# Patient Record
Sex: FEMALE | Race: WHITE | NOT HISPANIC OR LATINO | Employment: OTHER | URBAN - METROPOLITAN AREA
[De-identification: names, ages, dates, MRNs, and addresses within clinical notes are randomized per-mention and may not be internally consistent; named-entity substitution may affect disease eponyms.]

---

## 2023-04-13 ENCOUNTER — PATIENT OUTREACH (OUTPATIENT)
Dept: PRIMARY CARE | Facility: CLINIC | Age: 73
End: 2023-04-13
Payer: MEDICARE

## 2023-04-13 DIAGNOSIS — K92.2 GASTROINTESTINAL HEMORRHAGE, UNSPECIFIED GASTROINTESTINAL HEMORRHAGE TYPE: ICD-10-CM

## 2023-04-13 NOTE — PROGRESS NOTES
Discharge Facility: [facility]  Discharge Diagnosis: GI Bleed  Admission Date: 04/09/23  Discharge Date: 04/12/23    PCP appt: 04/20/23    Engagement  Call Start Time: 1049 (4/13/2023 10:49 AM)    Medications  Medications reviewed with patient/caregiver?: Yes (new/changed only) (4/13/2023 10:49 AM)  Is the patient having any side effects they believe may be caused by any medication additions or changes?: No (4/13/2023 10:49 AM)  Does the patient have all medications ordered at discharge?: Yes (4/13/2023 10:49 AM)  Care Management Interventions: No intervention needed (4/13/2023 10:49 AM)  Is the patient taking all medications as directed (includes completed medication regime)?: Yes (4/13/2023 10:49 AM)    Appointments  Does the patient have a primary care provider?: Yes (4/13/2023 10:49 AM)  Care Management Interventions: Verified appointment date/time/provider (4/13/2023 10:49 AM)  Has the patient kept scheduled appointments due by today?: Yes (4/13/2023 10:49 AM)  Care Management Interventions: Advised patient to keep appointment (4/13/2023 10:49 AM)    Self Management  Has home health visited the patient within 72 hours of discharge?: Not applicable (4/13/2023 10:49 AM)    Patient Teaching  Does the patient have access to their discharge instructions?: Yes (4/13/2023 10:49 AM)  What is the patient's perception of their health status since discharge?: Improving (4/13/2023 10:49 AM)    Wrap Up  Call End Time: 1112 (4/13/2023 10:49 AM)

## 2023-04-18 PROBLEM — T78.40XA ALLERGIES: Status: ACTIVE | Noted: 2023-04-18

## 2023-04-18 PROBLEM — K92.1 HEMATOCHEZIA: Status: ACTIVE | Noted: 2023-04-18

## 2023-04-18 PROBLEM — R19.7 NAUSEA, VOMITING, AND DIARRHEA: Status: ACTIVE | Noted: 2023-04-18

## 2023-04-18 PROBLEM — E78.5 HYPERLIPIDEMIA: Status: ACTIVE | Noted: 2023-04-18

## 2023-04-18 PROBLEM — G47.00 INSOMNIA: Status: ACTIVE | Noted: 2023-04-18

## 2023-04-18 PROBLEM — E03.9 ADULT HYPOTHYROIDISM: Status: ACTIVE | Noted: 2023-04-18

## 2023-04-18 PROBLEM — E86.0 DEHYDRATION: Status: ACTIVE | Noted: 2023-04-18

## 2023-04-18 PROBLEM — R07.89 ATYPICAL CHEST PAIN: Status: ACTIVE | Noted: 2023-04-18

## 2023-04-18 PROBLEM — B34.9 VIREMIA: Status: ACTIVE | Noted: 2023-04-18

## 2023-04-18 PROBLEM — F41.1 GENERALIZED ANXIETY DISORDER: Status: ACTIVE | Noted: 2023-04-18

## 2023-04-18 PROBLEM — K52.9 COLITIS: Status: ACTIVE | Noted: 2023-04-18

## 2023-04-18 PROBLEM — E66.3 OVERWEIGHT: Status: ACTIVE | Noted: 2023-04-18

## 2023-04-18 PROBLEM — R11.2 NAUSEA, VOMITING, AND DIARRHEA: Status: ACTIVE | Noted: 2023-04-18

## 2023-04-18 PROBLEM — R73.9 HYPERGLYCEMIA: Status: ACTIVE | Noted: 2023-04-18

## 2023-04-18 RX ORDER — CHOLECALCIFEROL (VITAMIN D3) 25 MCG
1 TABLET ORAL DAILY
COMMUNITY
Start: 2017-04-26

## 2023-04-18 RX ORDER — ASPIRIN 81 MG/1
1 TABLET ORAL DAILY
COMMUNITY
Start: 2017-04-26 | End: 2023-04-20 | Stop reason: SINTOL

## 2023-04-18 RX ORDER — ONDANSETRON 4 MG/1
TABLET, ORALLY DISINTEGRATING ORAL 4 TIMES DAILY PRN
COMMUNITY
Start: 2023-04-06 | End: 2023-08-15 | Stop reason: ALTCHOICE

## 2023-04-18 RX ORDER — ATORVASTATIN CALCIUM 20 MG/1
1 TABLET, FILM COATED ORAL DAILY
COMMUNITY
Start: 2021-09-21 | End: 2023-08-15 | Stop reason: SDUPTHER

## 2023-04-18 RX ORDER — MAGNESIUM 200 MG
1 TABLET ORAL DAILY
COMMUNITY
Start: 2017-04-26 | End: 2023-08-15 | Stop reason: ALTCHOICE

## 2023-04-18 RX ORDER — BUSPIRONE HYDROCHLORIDE 10 MG/1
TABLET ORAL
COMMUNITY
Start: 2023-02-13 | End: 2023-08-15 | Stop reason: SINTOL

## 2023-04-18 RX ORDER — BENZONATATE 100 MG/1
CAPSULE ORAL
COMMUNITY
Start: 2023-04-06 | End: 2023-08-15 | Stop reason: ALTCHOICE

## 2023-04-18 RX ORDER — POTASSIUM CHLORIDE 750 MG/1
1 TABLET, FILM COATED, EXTENDED RELEASE ORAL DAILY
Qty: 29 TABLET | Refills: 0 | COMMUNITY
Start: 2023-04-12 | End: 2023-05-11

## 2023-04-18 RX ORDER — BUSPIRONE HYDROCHLORIDE 5 MG/1
1 TABLET ORAL 2 TIMES DAILY
COMMUNITY
Start: 2023-01-16 | End: 2023-08-15 | Stop reason: SINTOL

## 2023-04-18 RX ORDER — CALCIUM CARBONATE 600 MG
1 TABLET ORAL DAILY
COMMUNITY
Start: 2017-04-26

## 2023-04-20 ENCOUNTER — OFFICE VISIT (OUTPATIENT)
Dept: PRIMARY CARE | Facility: CLINIC | Age: 73
End: 2023-04-20
Payer: MEDICARE

## 2023-04-20 ENCOUNTER — PATIENT OUTREACH (OUTPATIENT)
Dept: PRIMARY CARE | Facility: CLINIC | Age: 73
End: 2023-04-20

## 2023-04-20 VITALS
WEIGHT: 162 LBS | SYSTOLIC BLOOD PRESSURE: 100 MMHG | DIASTOLIC BLOOD PRESSURE: 84 MMHG | OXYGEN SATURATION: 98 % | HEIGHT: 65 IN | HEART RATE: 87 BPM | BODY MASS INDEX: 26.99 KG/M2 | TEMPERATURE: 97.5 F

## 2023-04-20 DIAGNOSIS — K52.9 COLITIS: Primary | ICD-10-CM

## 2023-04-20 DIAGNOSIS — E78.41 ELEVATED LIPOPROTEIN(A): ICD-10-CM

## 2023-04-20 DIAGNOSIS — F51.01 PRIMARY INSOMNIA: ICD-10-CM

## 2023-04-20 DIAGNOSIS — E66.3 OVERWEIGHT: ICD-10-CM

## 2023-04-20 DIAGNOSIS — R73.9 HYPERGLYCEMIA: ICD-10-CM

## 2023-04-20 DIAGNOSIS — F41.1 GENERALIZED ANXIETY DISORDER: ICD-10-CM

## 2023-04-20 DIAGNOSIS — U07.1 COVID-19: ICD-10-CM

## 2023-04-20 DIAGNOSIS — E03.9 ADULT HYPOTHYROIDISM: ICD-10-CM

## 2023-04-20 PROCEDURE — 1036F TOBACCO NON-USER: CPT | Performed by: FAMILY MEDICINE

## 2023-04-20 PROCEDURE — 1160F RVW MEDS BY RX/DR IN RCRD: CPT | Performed by: FAMILY MEDICINE

## 2023-04-20 PROCEDURE — 99213 OFFICE O/P EST LOW 20 MIN: CPT | Performed by: FAMILY MEDICINE

## 2023-04-20 PROCEDURE — 1159F MED LIST DOCD IN RCRD: CPT | Performed by: FAMILY MEDICINE

## 2023-04-20 ASSESSMENT — ENCOUNTER SYMPTOMS
UNEXPECTED WEIGHT CHANGE: 1
ACTIVITY CHANGE: 1
APPETITE CHANGE: 1
BLOOD IN STOOL: 1
LIGHT-HEADEDNESS: 1
FREQUENCY: 1
ENDOCRINE NEGATIVE: 1
CARDIOVASCULAR NEGATIVE: 1
ARTHRALGIAS: 1
NERVOUS/ANXIOUS: 1
HEMATOLOGIC/LYMPHATIC NEGATIVE: 1
ALLERGIC/IMMUNOLOGIC NEGATIVE: 1

## 2023-04-20 NOTE — PROGRESS NOTES
"Subjective   Patient ID: Toney Sierra is a 72 y.o. female who presents for Follow-up (Hospital follow up).    Hospital follow up         Review of Systems   Constitutional:  Positive for activity change, appetite change and unexpected weight change.   HENT:  Positive for congestion.    Eyes:  Positive for visual disturbance.   Respiratory:  Shortness of breath: on exertion.    Cardiovascular: Negative.    Gastrointestinal:  Positive for blood in stool.   Endocrine: Negative.    Genitourinary:  Positive for frequency.   Musculoskeletal:  Positive for arthralgias.   Allergic/Immunologic: Negative.    Neurological:  Positive for light-headedness.   Hematological: Negative.    Psychiatric/Behavioral:  The patient is nervous/anxious.        Objective   /84 (BP Location: Left arm, Patient Position: Sitting, BP Cuff Size: Adult)   Pulse 87   Temp 36.4 °C (97.5 °F) (Temporal)   Ht 1.651 m (5' 5\")   Wt 73.5 kg (162 lb)   SpO2 98%   BMI 26.96 kg/m²     Physical Exam  Constitutional:       Appearance: She is obese.   HENT:      Head: Normocephalic and atraumatic.      Nose: Nose normal.      Mouth/Throat:      Mouth: Mucous membranes are moist.   Eyes:      Pupils: Pupils are equal, round, and reactive to light.   Cardiovascular:      Rate and Rhythm: Normal rate and regular rhythm.   Pulmonary:      Effort: Pulmonary effort is normal.      Breath sounds: Normal breath sounds.   Abdominal:      General: Abdomen is flat.      Palpations: Abdomen is soft.   Musculoskeletal:         General: Normal range of motion.      Cervical back: Normal range of motion.   Skin:     General: Skin is warm and dry.   Neurological:      General: No focal deficit present.      Mental Status: She is alert and oriented to person, place, and time.   Psychiatric:         Mood and Affect: Mood normal.         Behavior: Behavior normal.         Assessment/Plan          "

## 2023-05-04 DIAGNOSIS — K52.9 COLITIS: Primary | ICD-10-CM

## 2023-05-04 RX ORDER — DICYCLOMINE HYDROCHLORIDE 10 MG/1
10 CAPSULE ORAL 2 TIMES DAILY
Qty: 60 CAPSULE | Refills: 3 | Status: SHIPPED | OUTPATIENT
Start: 2023-05-04 | End: 2023-06-03

## 2023-05-12 ENCOUNTER — PATIENT OUTREACH (OUTPATIENT)
Dept: PRIMARY CARE | Facility: CLINIC | Age: 73
End: 2023-05-12
Payer: MEDICARE

## 2023-05-12 NOTE — PROGRESS NOTES
Call placed regarding one month post discharge follow up call.  At time of outreach call the patient feels as if their condition has improved since initial visit with PCP or specialist.  Questions or concerns regarding recovery period addressed at this time.   Reviewed any PCP or specialists progress notes/labs/radiology reports if applicable and addressed any questions or concerns.

## 2023-06-05 ENCOUNTER — HOSPITAL ENCOUNTER (OUTPATIENT)
Dept: DATA CONVERSION | Facility: HOSPITAL | Age: 73
End: 2023-06-05
Attending: SURGERY | Admitting: SURGERY
Payer: MEDICARE

## 2023-06-05 DIAGNOSIS — K57.30 DIVERTICULOSIS OF LARGE INTESTINE WITHOUT PERFORATION OR ABSCESS WITHOUT BLEEDING: ICD-10-CM

## 2023-06-05 DIAGNOSIS — R10.32 LEFT LOWER QUADRANT PAIN: ICD-10-CM

## 2023-06-05 DIAGNOSIS — K52.9 NONINFECTIVE GASTROENTERITIS AND COLITIS, UNSPECIFIED: ICD-10-CM

## 2023-06-05 DIAGNOSIS — Z86.16 PERSONAL HISTORY OF COVID-19: ICD-10-CM

## 2023-06-09 LAB
COMPLETE PATHOLOGY REPORT: NORMAL
CONVERTED CLINICAL DIAGNOSIS-HISTORY: NORMAL
CONVERTED FINAL DIAGNOSIS: NORMAL
CONVERTED FINAL REPORT PDF LINK TO COPY AND PASTE: NORMAL
CONVERTED GROSS DESCRIPTION: NORMAL

## 2023-07-12 ENCOUNTER — DOCUMENTATION (OUTPATIENT)
Dept: PRIMARY CARE | Facility: CLINIC | Age: 73
End: 2023-07-12
Payer: MEDICARE

## 2023-08-15 ENCOUNTER — OFFICE VISIT (OUTPATIENT)
Dept: PRIMARY CARE | Facility: CLINIC | Age: 73
End: 2023-08-15
Payer: MEDICARE

## 2023-08-15 VITALS
HEIGHT: 65 IN | OXYGEN SATURATION: 96 % | SYSTOLIC BLOOD PRESSURE: 147 MMHG | WEIGHT: 167 LBS | HEART RATE: 85 BPM | DIASTOLIC BLOOD PRESSURE: 97 MMHG | BODY MASS INDEX: 27.82 KG/M2

## 2023-08-15 DIAGNOSIS — F41.1 GENERALIZED ANXIETY DISORDER: ICD-10-CM

## 2023-08-15 DIAGNOSIS — E55.9 VITAMIN D DEFICIENCY: ICD-10-CM

## 2023-08-15 DIAGNOSIS — Z79.899 DRUG THERAPY: ICD-10-CM

## 2023-08-15 DIAGNOSIS — K52.9 COLITIS: ICD-10-CM

## 2023-08-15 DIAGNOSIS — E66.3 OVERWEIGHT: ICD-10-CM

## 2023-08-15 DIAGNOSIS — E78.5 HYPERLIPIDEMIA, UNSPECIFIED HYPERLIPIDEMIA TYPE: Primary | ICD-10-CM

## 2023-08-15 DIAGNOSIS — E78.41 ELEVATED LIPOPROTEIN(A): ICD-10-CM

## 2023-08-15 DIAGNOSIS — Z00.00 PREVENTATIVE HEALTH CARE: Primary | ICD-10-CM

## 2023-08-15 DIAGNOSIS — Z13.9 SCREENING FOR CONDITION: ICD-10-CM

## 2023-08-15 DIAGNOSIS — R73.03 PREDIABETES: ICD-10-CM

## 2023-08-15 DIAGNOSIS — R51.9 NONINTRACTABLE HEADACHE, UNSPECIFIED CHRONICITY PATTERN, UNSPECIFIED HEADACHE TYPE: ICD-10-CM

## 2023-08-15 PROCEDURE — 99215 OFFICE O/P EST HI 40 MIN: CPT | Performed by: FAMILY MEDICINE

## 2023-08-15 PROCEDURE — 1159F MED LIST DOCD IN RCRD: CPT | Performed by: FAMILY MEDICINE

## 2023-08-15 PROCEDURE — 99397 PER PM REEVAL EST PAT 65+ YR: CPT | Performed by: FAMILY MEDICINE

## 2023-08-15 PROCEDURE — 1036F TOBACCO NON-USER: CPT | Performed by: FAMILY MEDICINE

## 2023-08-15 PROCEDURE — 1160F RVW MEDS BY RX/DR IN RCRD: CPT | Performed by: FAMILY MEDICINE

## 2023-08-15 RX ORDER — DICYCLOMINE HYDROCHLORIDE 10 MG/1
CAPSULE ORAL
Refills: 0
Start: 2023-08-15

## 2023-08-15 NOTE — PATIENT INSTRUCTIONS
Next Medicare wellness visit will be in 2024.  We will do annual preventative visit today  ------  Up-to-date on annual flu shot, next 1 due in the next month or so.  Pneumonia has had the PPSV23, is due for the PCV 20.  Due for shingles series.  Due for Tdap.  Has had coronavirus series, is due for the by valent booster.  -->> Check with your pharmacy to get up-to-date on immunizations.    Most recent colonoscopy was early 2023.  Due for repeat between 2028 and 2033.  Due for mammogram.  Patient declines offer for orders today.  ------    Headaches associated with visual disturbance.  Migraine is in the differential.  Maybe gets 1 every month or 2.  Lasting for only maybe 5 minutes.  Does well enough taking Excedrin Migraine as needed. -->>  Could consider restarting magnesium to see if that helps prevent the headaches.  Will reevaluate next time.      Overweight, BMI 27.  Patient has lost a be 10 pounds over the last year, down from max of 180. -->>  Keep up the good work.    Reviewed some labs on chart:  - Drug therapy, screening for condition, vitamin D deficiency.  Patient is taking vitamin D, 1000 units daily and has been for several years. -->>  Will be checking annual labs before next appointment.  -Prediabetes, A1c last time was 5.9, before that was 5.8.  Will check with next labs. -->> Recommend cutting back on simple carbohydrates, things like bread, pasta, potatoes, rice, sugars etc.   -Hyperlipidemia, most recent HDL was 69, goal is over 45, LDL was 72, goal is 70 or less, total to good ratio is 2.4, goal is less than 2.8, so cholesterol numbers were great on atorvastatin 20 mg daily. -->>  We will be checking fasting lipid panel with next labs.  Recommending also check on starting CoQ 10, 100 or 200 mg daily.  Should be able to find it for under $0.20 a pill.  Cheapest is fine, the more expensive ones are not that much better to make it worth the extra expense.      Anxiety.  In general doing well  enough without meds.  Patient is considering starting a meditative practice of some sort.  Looking for possibly a group to work with.  We also mentioned that could look just for videos on YouTube regarding the guided visualization, on a beach, or on [place], and spent 5 minutes daily just letting go of what ever thoughts are subconscious keeps throwing at you.      - We will schedule lab review for October 17 at 11:40 AM, overlapping her 's appointment.  - We ordered annual labs for patient to get fasting annual labs at the [facility] at least a week before next appointment.

## 2023-08-15 NOTE — PROGRESS NOTES
Subjective   Patient ID: Toney Sierra is a 72 y.o. female who presents for Establish Care (Pt in today to establish care).    Review of Systems  Denies N/V/D/C, no S/V, denies rashes/lesions, no CP/SOB. Denies fevers/chills. Positive for HAs.  All other systems were negative.    Objective   Physical Exam    Assessment/Plan   Diagnoses and all orders for this visit:  Colitis  -     dicyclomine (Bentyl) 10 mg capsule; 1 by mouth twice daily, prescribed by Dr. Allen  [facility] care  Prediabetes  Generalized anxiety disorder  Overweight  Elevated lipoprotein(a)  Drug therapy  Screening for condition  Nonintractable headache, unspecified chronicity pattern, unspecified headache type         Next Medicare wellness visit will be in 2024.  We will do annual preventative visit today  ------  Up-to-date on annual flu shot, next 1 due in the next month or so.  Pneumonia has had the PPSV23, is due for the PCV 20.  Due for shingles series.  Due for Tdap.  Has had coronavirus series, is due for the by valent booster.  -->> Check with your pharmacy to get up-to-date on immunizations.    Most recent colonoscopy was early 2023.  Due for repeat between 2028 and 2033.  Due for mammogram.  Patient declines offer for orders today.  ------  Headaches associated with visual disturbance.  Migraine is in the differential.  Maybe gets 1 every month or 2.  Lasting for only maybe 5 minutes.  Does well enough taking Excedrin Migraine as needed. -->>  Could consider restarting magnesium to see if that helps prevent the headaches.  Will reevaluate next time.    Overweight, BMI 27.  Patient has lost a be 10 pounds over the last year, down from max of 180. -->>  Keep up the good work.    Reviewed some labs on chart:  - Drug therapy, screening for condition, vitamin D deficiency.  Patient is taking vitamin D, 1000 units daily and has been for several years. -->>  Will be checking annual labs before next appointment.  -Prediabetes, A1c  last time was 5.9, before that was 5.8.  Will check with next labs. -->> Recommend cutting back on simple carbohydrates, things like bread, pasta, potatoes, rice, sugars etc.   -Hyperlipidemia, most recent HDL was 69, goal is over 45, LDL was 72, goal is 70 or less, total to good ratio is 2.4, goal is less than 2.8, so cholesterol numbers were great on atorvastatin 20 mg daily. -->>  We will be checking fasting lipid panel with next labs.  Recommending also check on starting CoQ 10, 100 or 200 mg daily.  Should be able to find it for under $0.20 a pill.  Cheapest is fine, the more expensive ones are not that much better to make it worth the extra expense.      Anxiety.  In general doing well enough without meds.  Patient is considering starting a meditative practice of some sort.  Looking for possibly a group to work with.  We also mentioned that could look just for videos on YouTube regarding the guided visualization, on a beach, or on [place], and spent 5 minutes daily just letting go of what ever thoughts are subconscious keeps throwing at you.      - We will schedule lab review for October 17 at 11:40 AM, overlapping her 's appointment.  - We ordered annual labs for patient to get fasting annual labs at the [facility] at least a week before next appointment.

## 2023-08-17 RX ORDER — ATORVASTATIN CALCIUM 20 MG/1
20 TABLET, FILM COATED ORAL DAILY
Qty: 90 TABLET | Refills: 1 | Status: SHIPPED | OUTPATIENT
Start: 2023-08-17 | End: 2024-02-19

## 2023-09-07 VITALS
HEART RATE: 79 BPM | RESPIRATION RATE: 16 BRPM | SYSTOLIC BLOOD PRESSURE: 116 MMHG | DIASTOLIC BLOOD PRESSURE: 77 MMHG | TEMPERATURE: 96.4 F

## 2023-09-27 ENCOUNTER — HOSPITAL ENCOUNTER (OUTPATIENT)
Dept: DATA CONVERSION | Facility: HOSPITAL | Age: 73
Setting detail: OBSERVATION
Discharge: HOME | End: 2023-09-28
Attending: STUDENT IN AN ORGANIZED HEALTH CARE EDUCATION/TRAINING PROGRAM | Admitting: STUDENT IN AN ORGANIZED HEALTH CARE EDUCATION/TRAINING PROGRAM
Payer: MEDICARE

## 2023-09-27 LAB
ALANINE AMINOTRANSFERASE (SGPT) (U/L) IN SER/PLAS: 12 U/L (ref 7–45)
ALBUMIN (G/DL) IN SER/PLAS: 4.2 G/DL (ref 3.4–5)
ALKALINE PHOSPHATASE (U/L) IN SER/PLAS: 48 U/L (ref 33–136)
ANION GAP IN SER/PLAS: 13 MMOL/L (ref 10–20)
ANION GAP IN SER/PLAS: NORMAL
APPEARANCE, URINE: CLEAR
ASPARTATE AMINOTRANSFERASE (SGOT) (U/L) IN SER/PLAS: 18 U/L (ref 9–39)
BASOPHILS (10*3/UL) IN BLOOD BY AUTOMATED COUNT: 0.03 X10E9/L (ref 0–0.1)
BASOPHILS/100 LEUKOCYTES IN BLOOD BY AUTOMATED COUNT: 0.5 % (ref 0–2)
BILIRUBIN TOTAL (MG/DL) IN SER/PLAS: 0.7 MG/DL (ref 0–1.2)
BILIRUBIN, URINE: NEGATIVE
BLOOD, URINE: NEGATIVE
CALCIUM (MG/DL) IN SER/PLAS: 9.1 MG/DL (ref 8.6–10.3)
CALCIUM (MG/DL) IN SER/PLAS: NORMAL
CARBON DIOXIDE, TOTAL (MMOL/L) IN SER/PLAS: 26 MMOL/L (ref 21–32)
CARBON DIOXIDE, TOTAL (MMOL/L) IN SER/PLAS: NORMAL
CHLORIDE (MMOL/L) IN SER/PLAS: 107 MMOL/L (ref 98–107)
CHLORIDE (MMOL/L) IN SER/PLAS: NORMAL
CHOLESTEROL (MG/DL) IN SER/PLAS: NORMAL
CHOLESTEROL IN HDL (MG/DL) IN SER/PLAS: NORMAL
CHOLESTEROL/HDL RATIO: NORMAL
COBALAMIN (VITAMIN B12) (PG/ML) IN SER/PLAS: NORMAL
COLOR, URINE: NORMAL
CREATININE (MG/DL) IN SER/PLAS: 0.87 MG/DL (ref 0.5–1.05)
CREATININE (MG/DL) IN SER/PLAS: NORMAL
EOSINOPHILS (10*3/UL) IN BLOOD BY AUTOMATED COUNT: 0.07 X10E9/L (ref 0–0.4)
EOSINOPHILS/100 LEUKOCYTES IN BLOOD BY AUTOMATED COUNT: 1.1 % (ref 0–6)
ERYTHROCYTE DISTRIBUTION WIDTH (RATIO) BY AUTOMATED COUNT: 12.7 % (ref 11.5–14.5)
ERYTHROCYTE DISTRIBUTION WIDTH (RATIO) BY AUTOMATED COUNT: NORMAL
ERYTHROCYTE MEAN CORPUSCULAR HEMOGLOBIN CONCENTRATION (G/DL) BY AUTOMATED: 33.2 G/DL (ref 32–36)
ERYTHROCYTE MEAN CORPUSCULAR HEMOGLOBIN CONCENTRATION (G/DL) BY AUTOMATED: NORMAL
ERYTHROCYTE MEAN CORPUSCULAR VOLUME (FL) BY AUTOMATED COUNT: 95 FL (ref 80–100)
ERYTHROCYTE MEAN CORPUSCULAR VOLUME (FL) BY AUTOMATED COUNT: NORMAL
ERYTHROCYTES (10*6/UL) IN BLOOD BY AUTOMATED COUNT: 4.34 X10E12/L (ref 4–5.2)
ERYTHROCYTES (10*6/UL) IN BLOOD BY AUTOMATED COUNT: NORMAL
ESTIMATED AVERAGE GLUCOSE FOR HBA1C: NORMAL
ETHANOL (MG/DL) IN SER/PLAS: <10 MG/DL
GFR FEMALE: 71 ML/MIN/1.73M2
GFR FEMALE: NORMAL
GFR MALE: NORMAL
GLUCOSE (MG/DL) IN SER/PLAS: 118 MG/DL (ref 74–99)
GLUCOSE (MG/DL) IN SER/PLAS: NORMAL
GLUCOSE, URINE: NEGATIVE MG/DL
HEMATOCRIT (%) IN BLOOD BY AUTOMATED COUNT: 41.3 % (ref 36–46)
HEMATOCRIT (%) IN BLOOD BY AUTOMATED COUNT: NORMAL
HEMOGLOBIN (G/DL) IN BLOOD: 13.7 G/DL (ref 12–16)
HEMOGLOBIN (G/DL) IN BLOOD: NORMAL
HEMOGLOBIN A1C/HEMOGLOBIN TOTAL IN BLOOD: NORMAL
HGB A1C-DATA CONVERSION: NORMAL %
IMMATURE GRANULOCYTES/100 LEUKOCYTES IN BLOOD BY AUTOMATED COUNT: 0.2 % (ref 0–0.9)
INR IN PPP BY COAGULATION ASSAY: 0.9 (ref 0.9–1.1)
KETONES, URINE: NEGATIVE MG/DL
LDL: NORMAL
LEUKOCYTE ESTERASE, URINE: NEGATIVE
LEUKOCYTES (10*3/UL) IN BLOOD BY AUTOMATED COUNT: 6.4 X10E9/L (ref 4.4–11.3)
LEUKOCYTES (10*3/UL) IN BLOOD BY AUTOMATED COUNT: NORMAL
LYMPHOCYTES (10*3/UL) IN BLOOD BY AUTOMATED COUNT: 1.57 X10E9/L (ref 0.8–3)
LYMPHOCYTES/100 LEUKOCYTES IN BLOOD BY AUTOMATED COUNT: 24.7 % (ref 13–44)
MAGNESIUM (MG/DL) IN SER/PLAS: NORMAL
MONOCYTES (10*3/UL) IN BLOOD BY AUTOMATED COUNT: 0.47 X10E9/L (ref 0.05–0.8)
MONOCYTES/100 LEUKOCYTES IN BLOOD BY AUTOMATED COUNT: 7.4 % (ref 2–10)
NEUTROPHILS (10*3/UL) IN BLOOD BY AUTOMATED COUNT: 4.2 X10E9/L (ref 1.6–5.5)
NEUTROPHILS/100 LEUKOCYTES IN BLOOD BY AUTOMATED COUNT: 66.1 % (ref 40–80)
NITRITE, URINE: NEGATIVE
NON HDL CHOLESTEROL: NORMAL
NRBC (PER 100 WBCS) BY AUTOMATED COUNT: NORMAL
PH, URINE: 7 (ref 5–8)
PLATELETS (10*3/UL) IN BLOOD AUTOMATED COUNT: 149 X10E9/L (ref 150–450)
PLATELETS (10*3/UL) IN BLOOD AUTOMATED COUNT: NORMAL
POCT GLUCOSE: 122 MG/DL (ref 74–99)
POTASSIUM (MMOL/L) IN SER/PLAS: 4 MMOL/L (ref 3.5–5.3)
POTASSIUM (MMOL/L) IN SER/PLAS: NORMAL
PROTEIN TOTAL: 6.4 G/DL (ref 6.4–8.2)
PROTEIN, URINE: NEGATIVE MG/DL
PROTHROMBIN TIME (PT) IN PPP BY COAGULATION ASSAY: 10.3 SEC (ref 9.8–12.8)
SODIUM (MMOL/L) IN SER/PLAS: 142 MMOL/L (ref 136–145)
SODIUM (MMOL/L) IN SER/PLAS: NORMAL
SPECIFIC GRAVITY, URINE: 1.01 (ref 1–1.03)
THYROTROPIN (MIU/L) IN SER/PLAS BY DETECTION LIMIT <= 0.05 MIU/L: NORMAL
TRIGLYCERIDE (MG/DL) IN SER/PLAS: NORMAL
TROPONIN I, HIGH SENSITIVITY: <3 NG/L (ref 0–13)
UREA NITROGEN (MG/DL) IN SER/PLAS: 13 MG/DL (ref 6–23)
UREA NITROGEN (MG/DL) IN SER/PLAS: NORMAL
UROBILINOGEN, URINE: <2 MG/DL (ref 0–1.9)
VLDL: NORMAL

## 2023-09-28 LAB
ANION GAP IN SER/PLAS: 13 MMOL/L (ref 10–20)
CALCIUM (MG/DL) IN SER/PLAS: 8.8 MG/DL (ref 8.6–10.3)
CARBON DIOXIDE, TOTAL (MMOL/L) IN SER/PLAS: 26 MMOL/L (ref 21–32)
CHLORIDE (MMOL/L) IN SER/PLAS: 106 MMOL/L (ref 98–107)
CHOLESTEROL (MG/DL) IN SER/PLAS: 168 MG/DL (ref 0–199)
CHOLESTEROL IN HDL (MG/DL) IN SER/PLAS: 67.1 MG/DL
CHOLESTEROL/HDL RATIO: 2.5
COBALAMIN (VITAMIN B12) (PG/ML) IN SER/PLAS: 1012 PG/ML (ref 211–911)
CREATININE (MG/DL) IN SER/PLAS: 0.75 MG/DL (ref 0.5–1.05)
ERYTHROCYTE DISTRIBUTION WIDTH (RATIO) BY AUTOMATED COUNT: 12.7 % (ref 11.5–14.5)
ERYTHROCYTE MEAN CORPUSCULAR HEMOGLOBIN CONCENTRATION (G/DL) BY AUTOMATED: 32.7 G/DL (ref 32–36)
ERYTHROCYTE MEAN CORPUSCULAR VOLUME (FL) BY AUTOMATED COUNT: 94 FL (ref 80–100)
ERYTHROCYTES (10*6/UL) IN BLOOD BY AUTOMATED COUNT: 4.42 X10E12/L (ref 4–5.2)
ESTIMATED AVERAGE GLUCOSE FOR HBA1C: 111 MG/DL
GFR FEMALE: 85 ML/MIN/1.73M2
GLUCOSE (MG/DL) IN SER/PLAS: 96 MG/DL (ref 74–99)
HEMATOCRIT (%) IN BLOOD BY AUTOMATED COUNT: 41.6 % (ref 36–46)
HEMOGLOBIN (G/DL) IN BLOOD: 13.6 G/DL (ref 12–16)
HEMOGLOBIN A1C/HEMOGLOBIN TOTAL IN BLOOD: 5.5 %
LDL: 75 MG/DL (ref 0–99)
LEUKOCYTES (10*3/UL) IN BLOOD BY AUTOMATED COUNT: 6.1 X10E9/L (ref 4.4–11.3)
MAGNESIUM (MG/DL) IN SER/PLAS: 2.08 MG/DL (ref 1.6–2.4)
PLATELETS (10*3/UL) IN BLOOD AUTOMATED COUNT: 156 X10E9/L (ref 150–450)
POTASSIUM (MMOL/L) IN SER/PLAS: 3.4 MMOL/L (ref 3.5–5.3)
SODIUM (MMOL/L) IN SER/PLAS: 142 MMOL/L (ref 136–145)
THYROTROPIN (MIU/L) IN SER/PLAS BY DETECTION LIMIT <= 0.05 MIU/L: 4.46 MIU/L (ref 0.44–3.98)
THYROXINE (T4) FREE (NG/DL) IN SER/PLAS: 0.77 NG/DL (ref 0.61–1.12)
THYROXINE (T4) FREE (NG/DL) IN SER/PLAS: NORMAL
TRIGLYCERIDE (MG/DL) IN SER/PLAS: 128 MG/DL (ref 0–149)
UREA NITROGEN (MG/DL) IN SER/PLAS: 13 MG/DL (ref 6–23)
VLDL: 26 MG/DL (ref 0–40)

## 2023-09-29 ENCOUNTER — PATIENT OUTREACH (OUTPATIENT)
Dept: PRIMARY CARE | Facility: CLINIC | Age: 73
End: 2023-09-29
Payer: MEDICARE

## 2023-09-29 VITALS
SYSTOLIC BLOOD PRESSURE: 188 MMHG | WEIGHT: 175.04 LBS | TEMPERATURE: 98.1 F | DIASTOLIC BLOOD PRESSURE: 93 MMHG | HEART RATE: 71 BPM | RESPIRATION RATE: 18 BRPM | BODY MASS INDEX: 29.16 KG/M2 | OXYGEN SATURATION: 96 % | HEIGHT: 65 IN

## 2023-09-29 RX ORDER — FLUOXETINE 10 MG/1
10 CAPSULE ORAL DAILY
COMMUNITY
Start: 2023-09-28

## 2023-09-29 RX ORDER — AMLODIPINE BESYLATE 5 MG/1
5 TABLET ORAL DAILY
COMMUNITY
Start: 2023-09-28 | End: 2024-02-22 | Stop reason: ALTCHOICE

## 2023-09-29 NOTE — PROGRESS NOTES
Discharge Facility:  [facility]  Discharge Diagnosis: TIA/Anxiety  Admission Date: 9/27/2023  Discharge Date: 9/28/2023    PCP Appointment Date: 10/5/2023  Specialist Appointment Date: TBD  Hospital Encounter and Summary: not available at this time  See discharge assessment below for further details    Engagement  Call Start Time: 1348 (9/29/2023  2:24 PM)    Medications  Medications reviewed with patient/caregiver?: Yes (meds discussed) (9/29/2023  2:24 PM)  Is the patient having any side effects they believe may be caused by any medication additions or changes?: No (9/29/2023  2:24 PM)  Does the patient have all medications ordered at discharge?: Yes (9/29/2023  2:24 PM)  Care Management Interventions: No intervention needed (9/29/2023  2:24 PM)  Prescription Comments: see med list (fluoxetine; amlodipine) (9/29/2023  2:24 PM)  Is the patient taking all medications as directed (includes completed medication regime)?: Yes (9/29/2023  2:24 PM)  Care Management Interventions: Provided patient education (9/29/2023  2:24 PM)    Appointments  Does the patient have a primary care provider?: Yes (9/29/2023  2:24 PM)  Care Management Interventions: Verified appointment date/time/provider (9/29/2023  2:24 PM)  Has the patient kept scheduled appointments due by today?: Yes (9/29/2023  2:24 PM)  Care Management Interventions: Advised patient to keep appointment (9/29/2023  2:24 PM)    Self Management  What is the home health agency?: denies need (9/29/2023  2:24 PM)  Has home health visited the patient within 72 hours of discharge?: Yes (9/29/2023  2:24 PM)    Patient Teaching  Does the patient have access to their discharge instructions?: Yes (9/29/2023  2:24 PM)  Care Management Interventions: Reviewed instructions with patient (9/29/2023  2:24 PM)  What is the patient's perception of their health status since discharge?: Improving (9/29/2023  2:24 PM)  Is the patient/caregiver able to teach back the hierarchy  "of who to call/visit for symptoms/problems? PCP, Specialist, Home Health nurse, Urgent Care, ED, 911: Yes (9/29/2023  2:24 PM)  Patient/Caregiver Education Comments: Patient states she is feeling better and states she thought she was having a stroke as her face was going numb but has contributed symptoms to anxiety now. States she was placed on new medication and she is \"anxious to get it to start working\" and realizes effects can take a couple weeks. (9/29/2023  2:24 PM)        "

## 2023-09-30 ENCOUNTER — LAB (OUTPATIENT)
Dept: LAB | Facility: LAB | Age: 73
End: 2023-09-30
Payer: MEDICARE

## 2023-09-30 DIAGNOSIS — Z00.00 PREVENTATIVE HEALTH CARE: ICD-10-CM

## 2023-09-30 DIAGNOSIS — E55.9 VITAMIN D DEFICIENCY: ICD-10-CM

## 2023-09-30 LAB — 25(OH)D3 SERPL-MCNC: 31 NG/ML (ref 30–100)

## 2023-09-30 PROCEDURE — 36415 COLL VENOUS BLD VENIPUNCTURE: CPT

## 2023-09-30 NOTE — H&P
History of Present Illness:   History Present Illness:  Reason for surgery: colitis   HPI:    as above    Allergies:        Allergies:  ·  No Known Allergies :     Home Medication Review:   Home Medications Reviewed: yes     Impression/Procedure:   ·  Impression and Planned Procedure: colonoscopy       ERAS (Enhanced Recovery After Surgery):  ·  ERAS Patient: no       Vital Signs:  Temperature C: 35.8 degrees C   Temperature F: 96.4 degrees F   Heart Rate: 79 beats per minute   Respiratory Rate: 16 breath per minute   Blood Pressure Systolic: 116 mm/Hg   Blood Pressure Diastolic: 77 mm/Hg     Physical Exam by System:    Constitutional: Well developed, awake/alert/oriented  x3, no distress, alert and cooperative   Eyes: PERRL, EOMI, clear sclera   ENMT: mucous membranes moist, no apparent injury,  no lesions seen   Head/Neck: Neck supple, no apparent injury   Respiratory/Thorax: Patent airways, CTAB, normal  breath sounds with good chest expansion, thorax symmetric   Cardiovascular: Regular, rate and rhythm   Gastrointestinal: Nondistended, soft, non-tender,  no rebound tenderness or guarding, no masses palpable, no organomegaly   Musculoskeletal: ROM intact, no joint swelling, normal  strength   Extremities: normal extremities, no cyanosis edema,  contusions or wounds, no clubbing   Neurological: alert and oriented x3, intact senses,  motor, response and reflexes, normal strength   Psychological: Appropriate mood and behavior   Skin: Warm and dry, no lesions, no rashes     Consent:   COVID-19 Consent:  ·  COVID-19 Risk Consent Surgeon has reviewed key risks related to the risk of bridger COVID-19 and if they contract COVID-19 what the risks are.       Electronic Signatures:  Pamela Allen)  (Signed 05-Jun-2023 08:20)   Authored: History of Present Illness, Allergies, Home  Medication Review, Impression/Procedure, ERAS, Physical Exam, Consent, Note Completion      Last Updated: 05-Jun-2023 08:20 by Alejandro  Pamela KNOWLES)

## 2023-09-30 NOTE — DISCHARGE SUMMARY
Send Summary:   Discharge Summary Providers:  Provider Role Provider Name   · Attending Angel Luis Deshpande   · Consulting Rodney Dalton   · Primary Rao Babin       Discharge:    Summary:   Admission Date: .27-Sep-2023 12:48:00   Discharge Date: 28-Sep-2023   Admission Reason: TIA-like symptoms   Final Discharge Diagnoses: TIA/anxiety   Procedures: none   Vital Signs:        T   P  R  BP   MAP  SpO2   Value  36.2  69  16  159/75   93  93%  Date/Time 9/28 9:13 9/28 9:13 9/28 9:13 9/28 9:13  9/28 0:20 9/28 9:13  Range  (36C - 36.7C )  (66 - 82 )  (14 - 18 )  (124 - 188 )/ (68 - 99 )  (93 - 121 )  (92% - 98% )    Date:            Weight/Scale Type:  Height:   27-Sep-2023 21:24  79.4  kg / bed  165.1  cm  Physical Exam:    Eyes: PERRL, EOMI, clear sclera   ENMT: mucous membranes moist, no apparent  injury, no lesions seen   Respiratory/Thorax: Patent airways, CTAB,  normal breath sounds with good chest expansion, thorax symmetric   Cardiovascular: Regular, rate and rhythm,  no murmurs, 2+ equal pulses of the extremities, normal S 1and S 2   Gastrointestinal: Nondistended, soft, non-tender,  no rebound tenderness or guarding, no masses palpable, no organomegaly, +BS, no bruits   Genitourinary: No Discharge, vesicles or  other abnormalities   Musculoskeletal: ROM intact, no joint swelling,  normal strength   Neurological: alert and oriented x3, intact  senses, motor, response and reflexes, normal strength     Hospital Course:    72-year-old female with past medical history of untreated anxiety, hyperlipidemia presented to the ER with strokelike symptoms.  Her symptoms were unusual for TIA  CVA.  Work-up including MRI, MRA head and neck, telemetry has been unremarkable.  Vitamin B12 level was actually high, TSH was borderline, free T4 has been ordered but I do not believe that is the cause of her symptoms.  We are waiting on an echocardiogram  once it is back and it is normal she can be discharged.  Neurology  has been consulted we will follow-up on recommendations.  We will advise her to take baby aspirin along with statin going forward we will also start her on a blood pressure medication.   Was started on Prozac for anxiety.      Discharge Information:    and Continuing Care:   Lab Results - Pending:    Hemoglobin A1C Drawn at 28-Sep-2023 05:31:00  Free Thyroxine, Serum Drawn at 28-Sep-2023 05:31:00  Radiology Results - Pending: None   Discharge Instructions:    Activity:           activity as tolerated.          May shower..      Nutrition/Diet:           low cholesterol,  low sodium    Additional Orders:           Additional Instructions:   Monitor blood pressure at home and if persistently above 140 start taking amlodipine    Recommend to take a baby aspirin daily as well along with statin    Fluoxetine started for anxiety    Follow Up Appointments:    Follow-Up Appointment 01:           Physician/Dept/Service:   PCP          Call to Schedule in:   1 week    Follow-Up Appointment 02:           Physician/Dept/Service:   Neurology          Call to Schedule in:   2 weeks    Discharge Medications: Home Medication   atorvastatin 20 mg oral tablet - 1 tab(s) orally once a day (in the evening)  Vitamin D3 25 mcg (1000 intl units) oral tablet - 1 tab(s) orally once a day (in the evening)  calcium (as carbonate) 600 mg oral tablet - 1 tab(s) orally once a day (in the evening)  Multiple Vitamins oral tablet - 1 tab(s) orally once a day (in the evening)  Vitamin B-12 1000 mcg sublingual tablet - 1 tab(s) sublingual once a day (in the evening)  aspirin 81 mg oral delayed release tablet - 1 tab(s) orally once a day  FLUoxetine 10 mg oral capsule - 1 cap(s) orally once a day  amLODIPine 5 mg oral tablet - 1 tab(s) orally once a day   dicyclomine 10 mg oral capsule - 1 cap(s) orally once a day (in the morning)     PRN Medication   dicyclomine 10 mg oral capsule - 1 cap(s) orally once a day (in the evening), As Needed - in addtion  to morning dose - if needed     DNR Status:   ·  Code Status Code Status order at time of discharge: Full Code       Electronic Signatures:  Angel Luis Deshpande)  (Signed 28-Sep-2023 09:21)   Authored: Send Summary, Summary Content, Ongoing Care,  DNR Status, Note Completion      Last Updated: 28-Sep-2023 09:21 by Angel Luis Deshpande)

## 2023-09-30 NOTE — H&P
History of Present Illness:   HPI:    JENNIFER QUILES is a  72-year-old female with past medical history of anxiety, not on treatment, hyperlipidemia presented to the ER with strokelike symptoms.  According  to the patient she has been having numbness of her right hand/forearm and left hand/forearm including face at times for more than a year.  She had tingling and numbness of her right hand and right forearm when she was [place] yesterday.   Earlier today when she was sitting she started feeling numbness of her left hand that went up to involve her left forearm, arm and left side of the face.  She did not have any weakness, slurring of speech, facial deviation, balance issues or falls.  The  symptoms lasted for about 15 minutes but she got scared and came to the ER.  She does report being stressed and has a history of anxiety, was supposed to be on BuSpar but she stopped taking it because she did not like how it made her feel.  Denies any  previous history of CVAs.  Only risk factors are hyperlipidemia.  On arrival to the ER she was slightly hypertensive.  CBC and BMP were unremarkable.  UA did not show any leukocyte esterase or nitrites.  CT had did not show any acute stroke or infarct.      Past medical history  as per HPI  Review of systems as per HPI, comprehensive review of system performed  Family history positive for CVA in her father  Social history: Former smoker, quit in 1980s, no regular alcohol use           Allergies:  ·  No Known Allergies :     Medications Prior to Admission:   Outpatient Meds have not been reviewed.    Objective:     Objective Information:      T   P  R  BP   MAP  SpO2   Value  36.7  74  18  143/82      97%  Date/Time 9/27 13:19 9/27 14:57 9/27 14:57 9/27 14:57    9/27 14:57  Range  (36.7C - 36.7C )  (71 - 74 )  (18 - 18 )  (140 - 188 )/ (82 - 93 )    (96% - 97% )      Pain reported at 9/27 13:19: 0 = None    Physical Exam by System:    Constitutional: Well  developed, awake/alert/oriented  x3, no distress, alert and cooperative   Eyes: PERRL, EOMI, clear sclera   ENMT: mucous membranes moist, no apparent injury,  no lesions seen   Respiratory/Thorax: Patent airways, CTAB, normal  breath sounds with good chest expansion, thorax symmetric   Cardiovascular: Regular, rate and rhythm, no murmurs,  2+ equal pulses of the extremities, normal S 1and S 2   Gastrointestinal: Nondistended, soft, non-tender,  no rebound tenderness or guarding, no masses palpable, no organomegaly, +BS, no bruits   Genitourinary: No Discharge, vesicles or other abnormalities   Musculoskeletal: ROM intact, no joint swelling, normal  strength   Neurological: alert and oriented x3, intact senses,  motor, response and reflexes, normal strength   Skin: Warm and dry, no lesions, no rashes     Recent Lab Results:    Results:    CBC: 9/27/2023 13:15              \     Hgb     /                              \     13.7       /  WBC  ----------------  Plt               6.4       ----------------    149 L            /     Hct     \                              /     41.3       \            RBC: 4.34     MCV: 95     Neutrophil %: 66.1      CMP: 9/27/2023 13:15  NA+        Cl-     BUN  /                         142    107    13  /  --------------------------------  Glucose                ---------------------------  118 H    K+     HCO3-   Creat \                         4.0    26    0.87  \           \  T Bili  /                    \  0.7  /  AST  x ---- x ALT        18 x ---- x 12         /  Alk P   \               /  48  \  Calcium : 9.1     Anion Gap : 13     Albumin : 4.2     T Protein : 6.4           Coagulation: 9/27/2023 13:15  PT  /                    10.3  /  -------<    INR          ----------<      0.9  PTT\                              \                       Assessment and Plan:   Assessment:    72-year-old female with past medical history of hyperlipidemia admitted with strokelike  symptoms    Her symptoms do not seem like that of TIA/CVA, they are random, involve both limbs usually resolved by itself associated with headaches and tingling and she reports other anxiety symptoms as well  CT head reviewed, neuro exam was within normal limits  I will continue statin, start her on aspirin  Check a TSH, B12 and magnesium level  Observe on telemetry  Order an echocardiogram, MRA head and neck and MRI brain  PT OT eval  Neuro consult  We will start her on fluoxetine for anxiety symptoms  Repeat labs tomorrow  Supportive care, symptomatic treatment  DVT prophylaxis        Electronic Signatures:  Angel Luis Deshpande)  (Signed 27-Sep-2023 16:14)   Authored: History of Present Illness, Comorbidities,  Allergies, Medications Prior to Admission, Objective, Assessment and Plan, Note Completion      Last Updated: 27-Sep-2023 16:14 by Angel Luis Deshpande)

## 2023-10-05 ENCOUNTER — OFFICE VISIT (OUTPATIENT)
Dept: PRIMARY CARE | Facility: CLINIC | Age: 73
End: 2023-10-05
Payer: MEDICARE

## 2023-10-05 ENCOUNTER — HOSPITAL ENCOUNTER (EMERGENCY)
Facility: HOSPITAL | Age: 73
Discharge: HOME | End: 2023-10-05
Payer: MEDICARE

## 2023-10-05 VITALS
HEIGHT: 65 IN | SYSTOLIC BLOOD PRESSURE: 159 MMHG | WEIGHT: 163 LBS | BODY MASS INDEX: 27.16 KG/M2 | DIASTOLIC BLOOD PRESSURE: 94 MMHG | OXYGEN SATURATION: 96 % | HEART RATE: 99 BPM | TEMPERATURE: 96.6 F | RESPIRATION RATE: 18 BRPM

## 2023-10-05 VITALS
HEIGHT: 65 IN | OXYGEN SATURATION: 96 % | BODY MASS INDEX: 27.32 KG/M2 | WEIGHT: 164 LBS | DIASTOLIC BLOOD PRESSURE: 94 MMHG | HEART RATE: 109 BPM | SYSTOLIC BLOOD PRESSURE: 143 MMHG

## 2023-10-05 DIAGNOSIS — F41.1 GENERALIZED ANXIETY DISORDER: Primary | ICD-10-CM

## 2023-10-05 DIAGNOSIS — R20.2 PARESTHESIA: ICD-10-CM

## 2023-10-05 DIAGNOSIS — G45.9 TIA (TRANSIENT ISCHEMIC ATTACK): ICD-10-CM

## 2023-10-05 DIAGNOSIS — F41.9 ANXIETY: Primary | ICD-10-CM

## 2023-10-05 PROCEDURE — 1036F TOBACCO NON-USER: CPT | Performed by: FAMILY MEDICINE

## 2023-10-05 PROCEDURE — 99281 EMR DPT VST MAYX REQ PHY/QHP: CPT

## 2023-10-05 PROCEDURE — 99495 TRANSJ CARE MGMT MOD F2F 14D: CPT | Performed by: FAMILY MEDICINE

## 2023-10-05 PROCEDURE — 1159F MED LIST DOCD IN RCRD: CPT | Performed by: FAMILY MEDICINE

## 2023-10-05 PROCEDURE — 1160F RVW MEDS BY RX/DR IN RCRD: CPT | Performed by: FAMILY MEDICINE

## 2023-10-05 RX ORDER — LANOLIN ALCOHOL/MO/W.PET/CERES
100 CREAM (GRAM) TOPICAL DAILY
COMMUNITY

## 2023-10-05 RX ORDER — ASPIRIN 81 MG/1
81 TABLET ORAL DAILY
COMMUNITY

## 2023-10-05 ASSESSMENT — PAIN - FUNCTIONAL ASSESSMENT: PAIN_FUNCTIONAL_ASSESSMENT: 0-10

## 2023-10-05 ASSESSMENT — COLUMBIA-SUICIDE SEVERITY RATING SCALE - C-SSRS
1. IN THE PAST MONTH, HAVE YOU WISHED YOU WERE DEAD OR WISHED YOU COULD GO TO SLEEP AND NOT WAKE UP?: NO
2. HAVE YOU ACTUALLY HAD ANY THOUGHTS OF KILLING YOURSELF?: NO
6. HAVE YOU EVER DONE ANYTHING, STARTED TO DO ANYTHING, OR PREPARED TO DO ANYTHING TO END YOUR LIFE?: NO

## 2023-10-05 ASSESSMENT — PAIN SCALES - GENERAL: PAINLEVEL_OUTOF10: 0 - NO PAIN

## 2023-10-05 NOTE — PATIENT INSTRUCTIONS
1) follow-up with neurology as planned.    2) patient going to ER for panic at this time.    3) recommend inpatient sees psychiatry to discuss them possibly prescribing Klonopin if she finds that particularly helpful and is having intolerance to the fluoxetine.

## 2023-10-05 NOTE — PROGRESS NOTES
Subjective   Patient ID: Toney Sierra is a 72 y.o. female who presents for Hospital FU (Pt in today for [facility] FU s/p numbness in hand and tongue / HTN & Anxiety).    Review of Systems  Patient complains of anxiety, paresthesias.    Objective   Physical Exam  Gen: Stressed, fidgeting, patient not sitting still, fanning herself, not particularly cooperative with attempts to discuss calming techniques, had a lot of trouble listening, at least somewhat reminiscent of manic behavior  eyes: EOMI,   ENT: hearing grossly intact, no nasal discharge  resp: breathing comfortably, no SOB noted  derm: no rashes or lesions noted  neuro: CN II-XII grossly intact  psych: A&Ox3, mood pleasant, affect appropriate, recent and remote memory grossly intact.    Patient left before further physical examination could be done.    Assessment/Plan   There are no diagnoses linked to this encounter.      Nursing TCM note:    Engagement  Call Start Time: 1348 (9/29/2023  2:24 PM)    Medications  Medications reviewed with patient/caregiver?: Yes (meds discussed) (9/29/2023  2:24 PM)  Is the patient having any side effects they believe may be caused by any medication additions or changes?: No (9/29/2023  2:24 PM)  Does the patient have all medications ordered at discharge?: Yes (9/29/2023  2:24 PM)  Care Management Interventions: No intervention needed (9/29/2023  2:24 PM)  Prescription Comments: see med list (fluoxetine; amlodipine) (9/29/2023  2:24 PM)  Is the patient taking all medications as directed (includes completed medication regime)?: Yes (9/29/2023  2:24 PM)  Care Management Interventions: Provided patient education (9/29/2023  2:24 PM)    Appointments  Does the patient have a primary care provider?: Yes (9/29/2023  2:24 PM)  Care Management Interventions: Verified appointment date/time/provider (9/29/2023  2:24 PM)  Has the patient kept scheduled appointments due by today?: Yes (9/29/2023  2:24 PM)  Care Management  "Interventions: Advised patient to keep appointment (9/29/2023  2:24 PM)    Self Management  What is the home health agency?: denies need (9/29/2023  2:24 PM)  Has home health visited the patient within 72 hours of discharge?: Yes (9/29/2023  2:24 PM)    Patient Teaching  Does the patient have access to their discharge instructions?: Yes (9/29/2023  2:24 PM)  Care Management Interventions: Reviewed instructions with patient (9/29/2023  2:24 PM)  What is the patient's perception of their health status since discharge?: Improving (9/29/2023  2:24 PM)  Is the patient/caregiver able to teach back the hierarchy of who to call/visit for symptoms/problems? PCP, Specialist, Home Health nurse, Urgent Care, ED, 911: Yes (9/29/2023  2:24 PM)  Patient/Caregiver Education Comments: Patient states she is feeling better and states she thought she was having a stroke as her face was going numb but has contributed symptoms to anxiety now. States she was placed on new medication and she is \"anxious to get it to start working\" and realizes effects can take a couple weeks. (9/29/2023  2:24 PM)        Transition of Care    Inpatient facility: [facility]  Discharge diagnosis: TIA/Anxiety  Discharged to: Home  Discharge date: 9/28/23  Initial Call date: 9/29/23  Spoke with patient/caregiver: Yes  Do you need assistance  visits prior to your PCP visit: No  Home health care ordered: No  Have you been contacted by home care and have a start of care date: n/a  Are you taking medications as prescribed at discharge: pt was but has discontinued, says making her feel funny. Pt was very stressed, is going to go back to ER.  Referral to [facility] Pharmacist: No  Patient advised to bring all medications to PCP follow-up appointment.  Patient advised to follow discharge instructions until provider follow-up.  TCM visit date: 9/29/23  TCM provider visit with: 10/5/23    Numbness, paresthesia, bilateral arms, bilateral legs at times.  Also " possible TIA based on hospital discharge report.  Was told to follow-up with neurology.  It appears patient has not scheduled appointment.  -->> we will do referral.      Anxiety.  At previous appointment we had discussed the patient was doing well enough without meds, and was considering starting meditation, and we had discussed many options including YouTube videos to help with meditation.  Patient notes she was started on fluoxetine at the hospital, and she says she stopped taking it because it was making her anxiety worse.  She says that she has been taking her 's clonidine, and that works well.  I told her I would not prescribe clonidine, After that her anxiety, fidgeting, distractibility all noticeably worsened.  Despite repeated efforts at discussing cognitive behavioral approaches, psychiatry, calming techniques, it appeared that patient had no interest in listening.  I suggested that she could consider going back to the emergency room. -->> Patient left to go to emergency room to help with anxiety/panic.    Return in a few weeks for regular appointment as scheduled.

## 2023-10-05 NOTE — ED TRIAGE NOTES
PT. ARRIVED VIA PRIVATE CAR, RIDE PROVIDED, TO ED FROM PCP OFFICE FOR B/L LEG NUMBNESS. PT. STATES NUMBNESS X1WK, WAS SEEN IN ED FOR AND ADMITTED. PT. STATES SHE WENT TO PCP FOR FU CARE AND WAS TOLD TO COME TO ED OR FU W/ NEUROLOGIST LIKE ADVISED. PT. STATES SHE CAME HERE BECAUSE SHE FEELS NERVOUS. PT. DOES STATE TAKING HUSBANDS KLONOPIN 0.5MG FOR THE PAST YEAR W/O TELLING DOCTOR. PT. ANXIOUS IN TRIAGE, DENIES CP, SOB, N/V/D, HA, DIZZINESS/LIGHTHEADEDNESS.

## 2023-10-05 NOTE — ED PROVIDER NOTES
HPI   Chief Complaint   Patient presents with    Numbness     Numbness in b/l legs       This is a 72-year-old female coming in for concerns for possibly withdrawing or needing for further anxiety control.  Patient was recently hospitalized and was advised follow-up with neurology and PCP.  She saw the PCP today who told her that she needs to see psychiatry and neurology but patient wanted to come here for another opinion.      History provided by:  Patient                      No data recorded                Patient History   History reviewed. No pertinent past medical history.  Past Surgical History:   Procedure Laterality Date    COLONOSCOPY  04/26/2017    Colonoscopy (Fiberoptic)    MR HEAD ANGIO WO IV CONTRAST  9/27/2023    MR HEAD ANGIO WO IV CONTRAST 9/27/2023 [facility]    MR NECK ANGIO WO IV CONTRAST  9/27/2023    MR NECK ANGIO WO IV CONTRAST 9/27/2023 [facility]    OTHER SURGICAL HISTORY  04/26/2017    Oral Surgery Tooth Extraction [facility]    TONSILLECTOMY  04/26/2017    Tonsillectomy     No family history on file.  Social History     Tobacco Use    Smoking status: Never    Smokeless tobacco: Never   Vaping Use    Vaping Use: Never used   Substance Use Topics    Alcohol use: Not Currently     Alcohol/week: 1.0 standard drink of alcohol     Types: 1 Shots of liquor per week    Drug use: Never       Physical Exam   ED Triage Vitals [10/05/23 1536]   Temp Heart Rate Resp BP   35.9 °C (96.6 °F) 99 18 (!) 159/94      SpO2 Temp Source Heart Rate Source Patient Position   96 % Temporal -- Sitting      BP Location FiO2 (%)     Right arm --       Physical Exam  Vitals and nursing note reviewed.   Constitutional:       General: She is not in acute distress.     Appearance: Normal appearance. She is not ill-appearing or toxic-appearing.   HENT:      Head: Normocephalic and atraumatic.   Eyes:      Extraocular Movements: Extraocular movements intact.      Conjunctiva/sclera: Conjunctivae normal.      Pupils: Pupils are  equal, round, and reactive to light.   Cardiovascular:      Rate and Rhythm: Regular rhythm.      Pulses: Normal pulses.      Heart sounds: Normal heart sounds.   Pulmonary:      Effort: Pulmonary effort is normal. No respiratory distress.      Breath sounds: Normal breath sounds.   Abdominal:      General: Abdomen is flat. There is no distension.   Musculoskeletal:         General: Normal range of motion.      Cervical back: Normal range of motion and neck supple.   Skin:     General: Skin is warm and dry.   Neurological:      General: No focal deficit present.      Mental Status: She is alert and oriented to person, place, and time.   Psychiatric:         Mood and Affect: Mood normal.         Behavior: Behavior normal.         Thought Content: Thought content normal.         ED Course & MDM        Medical Decision Making  Summary:  Medical Decision Making:   Patient presented as described in HPI. Patient case including ROS, PE, and treatment and plan discussed with ED attending if attached as cosigner. Due to patients presentation orders completed include as documented. Toney Sierra  is a 72 y.o. coming in for Patient presents with:  Numbness: Numbness in b/l legs patient was recently hospitalized for this symptom.  She had MRI that was negative.  She has had numbness in her legs for greater than 1 year.  Patient denies any other neurological symptoms.  She states that she feels very anxious.  Patient was taking her 's Klonopin 0.5 mg but did not tell anybody.  For the last 8 days she has not been taking it.  Patient saw her PCP today and advised that she was taking that and that she was also on Prozac.  She states that the Prozac made her feel worse.  He would not give her prescription for Klonopin.  He states that she needed to see psychiatry.  Patient did not follow-up with neurology yet after inpatient stay.  She states that she is very anxious and would like to know what her numbness is as well as  wanting something for her symptoms.  I advised patient that we cannot give her benzodiazepines as she is not prescribed any outpatient.  Patient does not appear in acute distress or discomfort.  After after talking about the disposition and plan patient got up and walked out of the room.  I was unable to complete the discussion and patient left without treatment complete.   .   Patient was advised to follow up with PCP or recommended provider in 2-3 days for another evaluation and exam. I advised patient/guardian to return or go to closest emergency room immediately if symptoms change, get worse, new symptoms develop prior to follow up. If there is no improvement in symptoms in the next 24 hours they are advised to return for further evaluation and exam. I also explained the plan and treatment course. Patient/guardian is in agreement with plan, treatment course, and follow up and states verbally that they will comply.      Tests/Medications/Escalations of Care considered but not given: As in MDM    Patient care discussed with: N/A  Social Determinants affecting care: N/A    Final diagnosis and disposition as documented       LWTC   Disposition: Discharge      This note has been transcribed using voice recognition and may contain grammatical errors, misplaced words, incorrect words, incorrect phrases or other errors.         Procedure  Procedures     Wing Hutchins PA-C  10/05/23 0345

## 2023-10-06 ENCOUNTER — TELEPHONE (OUTPATIENT)
Dept: PRIMARY CARE | Facility: CLINIC | Age: 73
End: 2023-10-06
Payer: MEDICARE

## 2023-10-06 DIAGNOSIS — F41.9 ANXIETY: Primary | ICD-10-CM

## 2023-10-08 NOTE — TELEPHONE ENCOUNTER
Tell her thanks and absolutely no problem.     Referral sent. If she finds any psychiatrist, even non , who can get her in quicker, she can schedule there, and if she needs a referral, we can send it.    thanks

## 2023-10-17 ENCOUNTER — APPOINTMENT (OUTPATIENT)
Dept: PRIMARY CARE | Facility: CLINIC | Age: 73
End: 2023-10-17
Payer: MEDICARE

## 2023-10-17 ENCOUNTER — PATIENT OUTREACH (OUTPATIENT)
Dept: PRIMARY CARE | Facility: CLINIC | Age: 73
End: 2023-10-17

## 2023-10-17 NOTE — PROGRESS NOTES
"Call regarding appt. with PCP on 10/5/2023 after hospitalization.  At time of outreach call the patient feels as if their condition has improved since last visit. States she does still feel some numbness intermittently on the right side of her face but everything is \"so much better\" since she stopped her Prozac. States she only took it for 8 days before she quit taking the medication because it was \"messing me up\". Confirms she was able to get appt with neurology but not until December.  Reviewed the PCP appointment with the pt and addressed any questions or concerns.   "

## 2023-11-28 ENCOUNTER — PATIENT OUTREACH (OUTPATIENT)
Dept: PRIMARY CARE | Facility: CLINIC | Age: 73
End: 2023-11-28
Payer: MEDICARE

## 2023-11-28 NOTE — PROGRESS NOTES
Call placed regarding two month post discharge follow up call.  At time of outreach call the patient feels as if their condition has improved since initial visit with PCP or specialist. Patient states she is doing well with her anxiety at time of call and states she is not experiencing any stroke like or has experienced any stroke like symptoms since her discharge.   Questions or concerns regarding recovery period addressed at this time.

## 2023-12-20 ENCOUNTER — APPOINTMENT (OUTPATIENT)
Dept: NEUROLOGY | Facility: CLINIC | Age: 73
End: 2023-12-20
Payer: MEDICARE

## 2023-12-28 ENCOUNTER — PATIENT OUTREACH (OUTPATIENT)
Dept: PRIMARY CARE | Facility: CLINIC | Age: 73
End: 2023-12-28
Payer: MEDICARE

## 2024-02-13 DIAGNOSIS — E78.5 HYPERLIPIDEMIA, UNSPECIFIED HYPERLIPIDEMIA TYPE: ICD-10-CM

## 2024-02-19 RX ORDER — ATORVASTATIN CALCIUM 20 MG/1
20 TABLET, FILM COATED ORAL DAILY
Qty: 90 TABLET | Refills: 0 | Status: SHIPPED | OUTPATIENT
Start: 2024-02-19 | End: 2024-05-13 | Stop reason: SDUPTHER

## 2024-02-22 ENCOUNTER — OFFICE VISIT (OUTPATIENT)
Dept: PRIMARY CARE | Facility: CLINIC | Age: 74
End: 2024-02-22
Payer: MEDICARE

## 2024-02-22 VITALS
SYSTOLIC BLOOD PRESSURE: 141 MMHG | DIASTOLIC BLOOD PRESSURE: 93 MMHG | OXYGEN SATURATION: 91 % | HEIGHT: 65 IN | BODY MASS INDEX: 28.82 KG/M2 | HEART RATE: 106 BPM | WEIGHT: 173 LBS

## 2024-02-22 DIAGNOSIS — Z00.00 PREVENTATIVE HEALTH CARE: ICD-10-CM

## 2024-02-22 DIAGNOSIS — Z00.00 ROUTINE GENERAL MEDICAL EXAMINATION AT HEALTH CARE FACILITY: Primary | ICD-10-CM

## 2024-02-22 DIAGNOSIS — E55.9 VITAMIN D DEFICIENCY: ICD-10-CM

## 2024-02-22 DIAGNOSIS — R73.03 PREDIABETES: ICD-10-CM

## 2024-02-22 DIAGNOSIS — E03.9 HYPOTHYROIDISM, UNSPECIFIED TYPE: ICD-10-CM

## 2024-02-22 DIAGNOSIS — D50.9 IRON DEFICIENCY ANEMIA, UNSPECIFIED IRON DEFICIENCY ANEMIA TYPE: ICD-10-CM

## 2024-02-22 PROCEDURE — 99397 PER PM REEVAL EST PAT 65+ YR: CPT | Performed by: FAMILY MEDICINE

## 2024-02-22 PROCEDURE — 1036F TOBACCO NON-USER: CPT | Performed by: FAMILY MEDICINE

## 2024-02-22 PROCEDURE — 1170F FXNL STATUS ASSESSED: CPT | Performed by: FAMILY MEDICINE

## 2024-02-22 PROCEDURE — 1159F MED LIST DOCD IN RCRD: CPT | Performed by: FAMILY MEDICINE

## 2024-02-22 PROCEDURE — 99214 OFFICE O/P EST MOD 30 MIN: CPT | Performed by: FAMILY MEDICINE

## 2024-02-22 PROCEDURE — 1126F AMNT PAIN NOTED NONE PRSNT: CPT | Performed by: FAMILY MEDICINE

## 2024-02-22 PROCEDURE — G0439 PPPS, SUBSEQ VISIT: HCPCS | Performed by: FAMILY MEDICINE

## 2024-02-22 ASSESSMENT — ACTIVITIES OF DAILY LIVING (ADL)
GROCERY_SHOPPING: INDEPENDENT
DRESSING: INDEPENDENT
TAKING_MEDICATION: INDEPENDENT
DOING_HOUSEWORK: INDEPENDENT
BATHING: INDEPENDENT
MANAGING_FINANCES: INDEPENDENT

## 2024-02-22 ASSESSMENT — ENCOUNTER SYMPTOMS
LOSS OF SENSATION IN FEET: 0
OCCASIONAL FEELINGS OF UNSTEADINESS: 0
DEPRESSION: 0

## 2024-02-22 NOTE — PROGRESS NOTES
"Subjective   Reason for Visit: Toney Sierra is an 73 y.o. female here for a Medicare Wellness visit.          Reviewed all medications by prescribing practitioner or clinical pharmacist (such as prescriptions, OTCs, herbal therapies and supplements) and documented in the medical record.    Patient Care Team:  Rao Babin DO as PCP - General (Family Medicine)  Wing Hinton DO as PCP - [employer] PCP  Ivette Desir MA as Care Manager (Case Management)     Review of Systems  Denies N/V/D/C, no HA/S/V, denies rashes/lesions, no CP/SOB. Denies fevers/chills.  All other systems were negative.    Objective   Vitals:  BP (!) 141/93 (BP Location: Right arm, Patient Position: Sitting)   Pulse 106   Ht 1.651 m (5' 5\")   Wt 78.5 kg (173 lb)   SpO2 91%   BMI 28.79 kg/m²       Physical Exam  Gen: NAD  eyes: EOMI, PERRLA  ENT: hearing grossly intact, no nasal discharge  resp: CTABL, without R/R  heart: RRR without MRG  GI: abd: S/ND/NT, BS+  lymph: no axillary, cervical, supraclavicular lymphadenopathy noted   MS: gait grossly WNL,  derm: no rashes or lesions noted  neuro: CN II-XII grossly intact  psych: A&Ox3    Assessment/Plan   Problem List Items Addressed This Visit    None  Visit Diagnoses       Routine general medical examination at health care facility    -  Primary    Hypothyroidism, unspecified type        Relevant Orders    Thyroxine, Free    Triiodothyronine, Free    Thyroid Stimulating Hormone    Iron deficiency anemia, unspecified iron deficiency anemia type        Relevant Orders    Ferritin    Iron and TIBC    CBC    Vitamin B12    Folate    Prediabetes        Relevant Orders    Hemoglobin A1c    Vitamin D deficiency        Relevant Orders    Vitamin D 25-Hydroxy,Total (for eval of Vitamin D levels)    Preventative health care                     doing Medicare wellness visit and annual preventative visit today    ------    Immunization councelling: Due for annual flu shot, as well as " the latest covid booster. Pneumonia has had the PPSV23, is due for the PCV 20. Due for shingles series. Due for Tdap. Also due for new RSV shot. -->> Check with your pharmacy to keep up-to-date on immunizations.     Most recent colonoscopy was early 2023.  Due for repeat 2028.  Due for mammogram.  Patient declines offer for orders today.    ------    Overweight, BMI 28. down from max of 180. -->>  Keep up the good work.      Regarding previous labs on the chart: before next appt Appointment will recheck vitamin D, A1c, thyroid panel, anemia panel.  Annual labs will be around September.  - Drug therapy, screening for condition:     - vitamin D deficiency: Was 31 on last labs.  Patient is taking vitamin D, 1000 units daily and has been for several years. -->>  Increase your dose to 25,000 units weekly.  Will recheck in 3 to 6 months.     -Prediabetes:, A1c last time was 5.5, was 5.9, 5.8.  Will check with next labs. -->>  Continue cutting back on simple carbohydrates, things like bread, pasta, potatoes, rice, sugars etc.     -Hyperlipidemia: most recent HDL was 69, goal is over 45, LDL was 72, goal is 70 or less, total to good ratio is 2.4, goal is less than 2.8, so cholesterol numbers were great on atorvastatin 20 mg daily plus co-Q10. -->>  We will be checking fasting lipid panel with next labs.       -Subclinical hypothyroidism: we note elevated TSH on labs from September 2023, not been reevaluated.  -->>will check thyroid panel with next labs.    -Iron deficiency anemia: per history, recent labs on chart other than CBCs which were grossly normal. -->>  We will check an anemia panel with the next labs.      Headaches associated with visual disturbance.  Migraine is in the differential. Maybe gets 1 every month or 2.  Lasting for only maybe 5 minutes.  Does well enough taking Excedrin Migraine as needed. -->>  Could consider restarting magnesium to see if that helps prevent the headaches.  Will reevaluate next  time.       Anxiety.  In general doing well enough without meds.         - We will schedule lab review around April 1.  - We ordered annual labs for patient to get fasting annual labs at the [facility] at least a week before next appointment.

## 2024-02-22 NOTE — PATIENT INSTRUCTIONS
doing Medicare wellness visit and annual preventative visit today    ------    Immunization councelling: Due for annual flu shot, as well as the latest covid booster. Pneumonia has had the PPSV23, is due for the PCV 20. Due for shingles series. Due for Tdap. Also due for new RSV shot. -->> Check with your pharmacy to keep up-to-date on immunizations.     Most recent colonoscopy was early 2023.  Due for repeat 2028.  Due for mammogram.  Patient declines offer for orders today.    ------    Overweight, BMI 28. down from max of 180. -->>  Keep up the good work.      Regarding previous labs on the chart: before next appt Appointment will recheck vitamin D, A1c, thyroid panel, anemia panel.  Annual labs will be around September.  - Drug therapy, screening for condition:     - vitamin D deficiency: Was 31 on last labs.  Patient is taking vitamin D, 1000 units daily and has been for several years. -->>  Increase your dose to 25,000 units weekly.  Will recheck in 3 to 6 months.     -Prediabetes:, A1c last time was 5.5, was 5.9, 5.8.  Will check with next labs. -->>  Continue cutting back on simple carbohydrates, things like bread, pasta, potatoes, rice, sugars etc.     -Hyperlipidemia: most recent HDL was 69, goal is over 45, LDL was 72, goal is 70 or less, total to good ratio is 2.4, goal is less than 2.8, so cholesterol numbers were great on atorvastatin 20 mg daily plus co-Q10. -->>  We will be checking fasting lipid panel with next labs.       -Subclinical hypothyroidism: we note elevated TSH on labs from September 2023, not been reevaluated.  -->>will check thyroid panel with next labs.    -Iron deficiency anemia: per history, recent labs on chart other than CBCs which were grossly normal. -->>  We will check an anemia panel with the next labs.      Headaches associated with visual disturbance.  Migraine is in the differential. Maybe gets 1 every month or 2.  Lasting for only maybe 5 minutes.  Does well enough taking  Excedrin Migraine as needed. -->>  Could consider restarting magnesium to see if that helps prevent the headaches.  Will reevaluate next time.       Anxiety.  In general doing well enough without meds.         - We will schedule lab review around April 1.  - We ordered annual labs for patient to get fasting annual labs at the [facility] at least a week before next appointment.

## 2024-03-06 NOTE — CONSULTS
Service:   Service:  Service: Stroke     Consult:  Consult requested by (Attending Name): Angel Luis Deshpande   Reason: Physical Medicine and Rehabilitation     History of Present Illness:   History Present Illness:  HPI:    JENNIFER QUILES is a 72-year-old female with past medical history of anxiety, not on treatment, hyperlipidemia presented to the ER with strokelike  symptoms. According to the patient she has been having numbness of her right hand/forearm and left hand/forearm including face at times for more than a year. She had tingling and numbness of her right hand and right forearm when she was driving from [place]  yesterday. Earlier today when she was sitting she started feeling numbness of her left hand that went up to involve her left forearm, arm and left side of the face. She did not have any weakness, slurring of speech, facial deviation, balance issues or  falls. The symptoms lasted for about 15 minutes but she got scared and came to the ER.  I have seen and examined the patient. She was worked up for stroke-like symptoms of vision changes, bilateral numbness and tingling in upper/lower extremities. Her CT/MRI/MRA  are all negative for acute infarct or bleed, and no significant occlusions are noted. She is already taking a asa and statin drug at home. She does however has a history of headache that starts with vision changes, some numbness and tingling, and then  headache. She takes Ibuprofen at home which is sometimes effective, but other times the headache lasts all day. She states that lights and stress seen to trigger these episodes. Denies phonophobia, nausea, vomiting. Her dtr also gets migraines. She has  recently been going through a lot of stress involving her family ( had stroke, her granddaughter has had multiple brain surgeries) and feels that these symptoms correlate with her stress levels. She was started on Prozac this visit for her anxiety.  She was also noted to be  hypertensive on admission and will be started on medication to continue at home. Today her symptoms seemed to have resolved. She has never seen a neurologist for her headaches, and feels if they become burdensome, she will make  a follow up appt. with us.       PMH:  Anxiety  HLD  HTN    10 point ROS negative except as otherwise noted in the HPI.       Stroke Arrival/Symptom Onset:     Last Known Well - Date/Time: 27-Sep-2023 10:00            Allergies:  ·  No Known Allergies :     Objective:     Objective Information:        T   P  R  BP   MAP  SpO2   Value  36.2  69  16  159/75   93  93%  Date/Time 9/28 9:13 9/28 9:13 9/28 9:13 9/28 9:13  9/28 0:20 9/28 9:13  Range  (36C - 36.7C )  (66 - 82 )  (14 - 18 )  (124 - 188 )/ (68 - 99 )  (93 - 121 )  (92% - 98% )        Pain reported at 9/28 7:45: 0 = None         Weights   9/27 21:24: Weight in kg (Weight (kg))  79.4  9/27 21:24: Weight in lbs ((lbs))  175  9/27 21:24: BMI (kg/m2) (BMI (kg/m2))  29.129    Physical Exam Narrative:  ·  Physical Exam:    EXPANDED NEURO EXAM    MENTAL STATUS    Alert, attentive, and cooperative    Orientated to name, location, and date    Memory-recent (book, tree, coat) & remote intact    Language-spontaneous speech, comprehension, naming (book, tree, coat), repetition (no ifs, ands, or buts) intact    No right-left confusion    No extinction on double simultaneous stimulation    Sequencing tasks (point to window, then point to ceiling) intact    No delusions and/or hallucinations    Denies depression, anxiety, mary      CRANIAL NERVES    CN I-n/a    CN II, III-visual field intact, pupils equally, round, reactive 3mm to 2mm with light, accommodation intact     CN III, IV, VI-lids symmetric, no ptosis. EOM?s with full range, no nystagmus    CN V-facial sensation intact bilaterally with light touch (V1,V2,V3)    CN VII-symmetric facial shape and strength    CN VIII-hearing intact to voice    CN IX, X-palate raises symmetrically with  ?aah?    CN XI-normal strength with shoulder shrug    CN XII-tongue is midline without fasciculations (flicker)      MOTOR    Observation-no involuntary movements, no tremor, no hypokinesia    Inspection-no evidence of muscle wasting    Palpation-no tenderness of muscle    Muscle Tone-no resistance or rigidity with passive movement    Functional Testing-no arm drift, fine finger movements intact    Strength      L      R    Deltoids 5 5       Biceps 5 5    Triceps 5 5    Handgrip         5 5    Knee Flex 5 5    Knee Ext 5 5    Dorsiflex 5 5    Plantarflex 5 5      REFLEXES    2+ throughout    Bicep (C5/C6)    Tricep (C7/C8)    Patella (L3/L4)    Achilles (S1/S2)    Plantar Response-downward contraction bilaterally      COORDINATION & GAIT    Appendicular coordination- finger-nose-finger test without overshoot, heel-shin test performed accurately    Gait-nonataxic      SENSORY    Primary Sensation-no asymmetry in sensory level with sharp vs. dull     Cortical Sensation-no graphesthesia present bilaterally     Medications:    Medications:          Continuous Medications       --------------------------------  No continuous medications are active       Scheduled Medications       --------------------------------    1. Aspirin Enteric Coated:  81  mg  Oral  Daily    2. Atorvastatin:  20  mg  Oral  Daily    3. Cyanocobalamin:  100  microgram(s)  Oral  Daily    4. Dicyclomine:  10  mg  Oral  2 Times a Day Before Meals    5. Docusate:  100  mg  Oral  2 Times a Day    6. FLUoxetine:  10  mg  Oral  Daily    7. Heparin SubCutaneous:  5000  unit(s)  SubCutaneous  Every 8 Hours    8. Potassium Chloride Extended Release:  20  mEq  Oral  Daily    9. Sennosides:  2  tablet(s)  Oral  Daily         PRN Medications       --------------------------------    1. Acetaminophen:  650  mg  Oral  Every 4 Hours    2. Magnesium Hydroxide -Al Hydrox -Simethicone Oral Liquid:  30  mL  Oral  Every 6 Hours    3. Magnesium Hydroxide Oral Liquid:   10  mL  Oral  Every 24 Hours    4. Ondansetron Injectable:  4  mg  IntraVenous Push  Every 4 Hours    5. traMADol:  50  mg  Oral  Every 8 Hours         Conditional Medication Orders       --------------------------------    1. Perflutren Lipid Microsphere (Activated) 1.3 mL / NaCL 0.9% T.V. 10 mL Injectable:  0.5  mL  IntraVenous Push  Once      Recent Lab Results:    Results:        I have reviewed these laboratory results:    Lipid Panel  28-Sep-2023 05:31:00      Result Value    Cholesterol, Serum  168 .    AGE      DESIRABLE   BORDERLINE HIGH   HIGH   0-19 Y     0 - 169       170 - 199     >/= 200  20-24 Y     0 - 189       190 - 224     >/= 225        >24 Y     0 - 199       200 - 239     >/= 240 **All ranges are based on fasting samp    HDL Cholesterol, Serum  67.1 .    AGE      VERY LOW   LOW     NORMAL    HIGH     0-19 Y       < 35   < 40     40-45     ----  20-24 Y       ----   < 40       >45     ----    >24 Y        ----   < 40     40-60      >60.    Cholesterol/HDL Ratio  2.5 REF VALUESDESIRABLE  < 3.4HIGH RISK  > 5.0    LDL, Level  75 .                         NEAR      BORD    AGE      DESIRABLE  OPTIMAL    HIGH     HIGH     VERY HIGH   0-19 Y     0 - 109     ---    110-129   >/= 130      ----  20-24 Y     0 - 119     ---    120-159   >/= 160     ----    >24 Y     0 -    VLDL, Serum  26    Triglycerides, Serum  128 .    AGE      DESIRABLE   BORDERLINE HIGH   HIGH     VERY HIGH 0 D-90 D    19 - 174         ----         ----        ----91 D- 9 Y     0 -  74        75  -  99     >/= 100      ----  10-19 Y     0 -  89        90 - 129     >/= 130      ----      Basic Metabolic Panel  28-Sep-2023 05:31:00      Result Value    Glucose, Serum  96    NA  142    K  3.4   L   CL  106    Bicarbonate, Serum  26    Anion Gap, Serum  13    BUN  13    CREAT  0.75    GFR Female  85    Calcium, Serum  8.8      Complete Blood Count  28-Sep-2023 05:31:00      Result Value    White Blood Cell Count  6.1    Red Blood Cell  Count  4.42    HGB  13.6    HCT  41.6    MCV  94    MCHC  32.7    PLT  156    RDW-CV  12.7      Thyroid Stimulating Hormone, Serum  28-Sep-2023 05:31:00      Result Value    Thyroid Stimulating Hormone, Serum  4.46   H     Vitamin B12, Serum  28-Sep-2023 05:31:00      Result Value    Vitamin B12, Serum  1012   H     Magnesium, Serum  28-Sep-2023 05:31:00      Result Value    Magnesium, Serum  2.08      Free Thyroxine, Serum  28-Sep-2023 05:31:00      Result Value    Free Thyroxine, Serum  0.77      Urinalysis  27-Sep-2023 15:30:00      Result Value    Color, Urine  STRAW  Reference Range: STRAW,YELLOW    Appearance, Urine  CLEAR    Specific Gravity, Urine  1.006    pH, Urine  7.0    Protein, Urine  NEGATIVE    Glucose, Urine  NEGATIVE    Blood, Urine  NEGATIVE    Ketones, Urine  NEGATIVE    Bilirubin, Urine  NEGATIVE    Urobilinogen, Urine  <2.0    Nitrite, Urine  NEGATIVE    Leukocyte Esterase, Urine  NEGATIVE      Complete Blood Count + Differential  27-Sep-2023 13:15:00      Result Value    White Blood Cell Count  6.4    Red Blood Cell Count  4.34    HGB  13.7    HCT  41.3    MCV  95    MCHC  33.2    PLT  149   L   RDW-CV  12.7    Neutrophil %  66.1    Immature Granulocytes %  0.2    Lymphocyte %  24.7    Monocyte %  7.4    Eosinophil %  1.1    Basophil %  0.5    Neutrophil Count  4.20    Lymphocyte Count  1.57    Monocyte Count  0.47    Eosinophil Count  0.07    Basophil Count  0.03      Comprehensive Metabolic Panel  27-Sep-2023 13:15:00      Result Value    Glucose, Serum  118   H   NA  142    K  4.0    CL  107    Bicarbonate, Serum  26    Anion Gap, Serum  13    BUN  13    CREAT  0.87    GFR Female  71    Calcium, Serum  9.1    ALB  4.2    ALKP  48    T Pro  6.4    T Bili  0.7    Alanine Aminotransferase, Serum  12    Aspartate Transaminase, Serum  18      PT + INR, Plasma  27-Sep-2023 13:15:00      Result Value    Prothrombin Time, Plasma  10.3    International Normalized Ratio, Plasma  0.9      Troponin I,  High Sensitivity  27-Sep-2023 13:15:00      Result Value    Troponin I, High Sensitivity  <3      Ethanol Level  27-Sep-2023 13:15:00      Result Value    Ethanol Level  <10      Glucose_POCT  27-Sep-2023 13:02:00      Result Value    Glucose-POCT  122   H       Radiology Results:    Results:        Impression:    No evidence of significant stenosis on MRA of the neck.     MACRO:  None     MRA Neck without Contrast [Sep 27 2023  8:47PM]      Impression:    1.  There is no evidence for hemodynamically significant stenosis or  large branch vessel cutoffs of the visualized intracranial  vasculature.  2.  No definite aneurysm is demonstrated.     MACRO:  None     MRA Head without Contrast [Sep 27 2023  8:46PM]      Impression:    No evidence of acute infarct, intracranial mass effect or midline  shift. Global volume loss and chronic small vessel ischemic change     MACRO:  None     MRI Brain without Contrast [Sep 27 2023  8:45PM]      Impression:    No acute cardiopulmonary process.     MACRO:  None     Xray Chest 1 View [Sep 27 2023  2:28PM]      Impression:    No acute intracranial hemorrhage or mass-effect.     MACRO:  Nayana Evans discussed the significance and urgency of this critical  finding by telephone with  LAURA GARCIA on 9/27/2023 at 1:02 pm.  (**-RCF-**)  Findings:  See findings.           Document Only:  The critical information above was relayed directly by me by  telephone to LAURA GARCIA on 9/27/2023 at 1:03 pm.     CT Brain Attack Head without Contrast [Sep 27 2023  1:06PM]        Assessment/Recommendations:  Assessment:    #1 Vision changes, headache, numbness and tingling bilateral extremities r/t migraine vs hypertensive encephalopathy  #2 Hx of HLD  #3 Hx of Anxiety    Plan:  Continue current therapy. May consider a BB for HTN as can also be a preventative for migraine. Continue with asa and statin therapy outpatient. She can f/u with us for headache management.        Consult Status:  Consult  Status    (select all that apply): initial  consult complete, will follow   Consult Order ID: [id number]     Attestation:   Note Completion:  I am a:  Advanced Practice Provider   Attending Only - Shared Visit with Advanced Practice Provider This is a shared visit.  I have reviewed the Advanced Practice Provider?s encounter note, approve the Advanced Practice Provider?s documentation,  and provide the following additional information from my personal encounter.   Comments/ Additional Findings    Addendum: Patient seen and examined.  Agree with the plan.  Patient work-up was unremarkable.  Continue with aspirin 81 mg 2 tablets daily along  with statin.  Signs symptoms of CVA TIA were discussed with patient which patient understood and can follow-up with us in 4 to 6 weeks.  [id number].    Due to technical limitations of voice recognition and human error, this note may not accurately reflect the care of the patient.        Electronic Signatures:  Rodney Dalton)  (Signed 28-Sep-2023 18:16)   Authored: Assessment/Recommendations, Note Completion   Co-Signer: Service, History of Present Illness, Stroke Arrival/Symptom Onset, Allergies, Objective, Assessment/Recommendations, Note Completion  Birdie Cabrera (APRN-CNP)  (Signed 28-Sep-2023 11:26)   Authored: Service, History of Present Illness, Stroke  Arrival/Symptom Onset, Allergies, Objective, Assessment/Recommendations, Note Completion      Last Updated: 28-Sep-2023 18:16 by Rodney Dalton)

## 2024-03-25 ENCOUNTER — LAB (OUTPATIENT)
Dept: LAB | Facility: LAB | Age: 74
End: 2024-03-25
Payer: MEDICARE

## 2024-03-25 DIAGNOSIS — E55.9 VITAMIN D DEFICIENCY: ICD-10-CM

## 2024-03-25 DIAGNOSIS — R73.03 PREDIABETES: ICD-10-CM

## 2024-03-25 DIAGNOSIS — D50.9 IRON DEFICIENCY ANEMIA, UNSPECIFIED IRON DEFICIENCY ANEMIA TYPE: ICD-10-CM

## 2024-03-25 DIAGNOSIS — E03.9 HYPOTHYROIDISM, UNSPECIFIED TYPE: ICD-10-CM

## 2024-03-25 LAB
25(OH)D3 SERPL-MCNC: 31 NG/ML (ref 30–100)
ERYTHROCYTE [DISTWIDTH] IN BLOOD BY AUTOMATED COUNT: 12.4 % (ref 11.5–14.5)
EST. AVERAGE GLUCOSE BLD GHB EST-MCNC: 114 MG/DL
FERRITIN SERPL-MCNC: 130 NG/ML (ref 8–150)
FOLATE SERPL-MCNC: >22.3 NG/ML
HBA1C MFR BLD: 5.6 %
HCT VFR BLD AUTO: 45.6 % (ref 36–46)
HGB BLD-MCNC: 14.9 G/DL (ref 12–16)
IRON SATN MFR SERPL: 27 % (ref 25–45)
IRON SERPL-MCNC: 111 UG/DL (ref 35–150)
MCH RBC QN AUTO: 31.2 PG (ref 26–34)
MCHC RBC AUTO-ENTMCNC: 32.7 G/DL (ref 32–36)
MCV RBC AUTO: 95 FL (ref 80–100)
NRBC BLD-RTO: 0 /100 WBCS (ref 0–0)
PLATELET # BLD AUTO: 167 X10*3/UL (ref 150–450)
RBC # BLD AUTO: 4.78 X10*6/UL (ref 4–5.2)
T4 FREE SERPL-MCNC: 0.8 NG/DL (ref 0.61–1.12)
TIBC SERPL-MCNC: 407 UG/DL (ref 240–445)
TSH SERPL-ACNC: 1.75 MIU/L (ref 0.44–3.98)
UIBC SERPL-MCNC: 296 UG/DL (ref 110–370)
VIT B12 SERPL-MCNC: 643 PG/ML (ref 211–911)
WBC # BLD AUTO: 6.3 X10*3/UL (ref 4.4–11.3)

## 2024-03-25 PROCEDURE — 82607 VITAMIN B-12: CPT

## 2024-03-25 PROCEDURE — 83036 HEMOGLOBIN GLYCOSYLATED A1C: CPT

## 2024-03-25 PROCEDURE — 83550 IRON BINDING TEST: CPT

## 2024-03-25 PROCEDURE — 82306 VITAMIN D 25 HYDROXY: CPT

## 2024-03-25 PROCEDURE — 83540 ASSAY OF IRON: CPT

## 2024-03-25 PROCEDURE — 84439 ASSAY OF FREE THYROXINE: CPT

## 2024-03-25 PROCEDURE — 84481 FREE ASSAY (FT-3): CPT

## 2024-03-25 PROCEDURE — 82746 ASSAY OF FOLIC ACID SERUM: CPT

## 2024-03-25 PROCEDURE — 82728 ASSAY OF FERRITIN: CPT

## 2024-03-25 PROCEDURE — 85027 COMPLETE CBC AUTOMATED: CPT

## 2024-03-25 PROCEDURE — 84443 ASSAY THYROID STIM HORMONE: CPT

## 2024-03-25 PROCEDURE — 36415 COLL VENOUS BLD VENIPUNCTURE: CPT

## 2024-03-26 LAB — T3FREE SERPL-MCNC: 2.8 PG/ML (ref 2.3–4.2)

## 2024-04-01 ENCOUNTER — OFFICE VISIT (OUTPATIENT)
Dept: PRIMARY CARE | Facility: CLINIC | Age: 74
End: 2024-04-01
Payer: MEDICARE

## 2024-04-01 VITALS
WEIGHT: 176 LBS | SYSTOLIC BLOOD PRESSURE: 132 MMHG | BODY MASS INDEX: 29.32 KG/M2 | HEART RATE: 86 BPM | HEIGHT: 65 IN | DIASTOLIC BLOOD PRESSURE: 86 MMHG | OXYGEN SATURATION: 94 %

## 2024-04-01 DIAGNOSIS — Z00.00 PREVENTATIVE HEALTH CARE: ICD-10-CM

## 2024-04-01 DIAGNOSIS — R73.03 PREDIABETES: ICD-10-CM

## 2024-04-01 DIAGNOSIS — E66.3 OVERWEIGHT: ICD-10-CM

## 2024-04-01 DIAGNOSIS — Z79.899 DRUG THERAPY: Primary | ICD-10-CM

## 2024-04-01 DIAGNOSIS — Z13.9 SCREENING FOR CONDITION: ICD-10-CM

## 2024-04-01 DIAGNOSIS — E55.9 VITAMIN D DEFICIENCY: ICD-10-CM

## 2024-04-01 DIAGNOSIS — E78.41 ELEVATED LIPOPROTEIN(A): ICD-10-CM

## 2024-04-01 PROBLEM — R19.7 NAUSEA, VOMITING, AND DIARRHEA: Status: RESOLVED | Noted: 2023-04-18 | Resolved: 2024-04-01

## 2024-04-01 PROBLEM — K52.9 COLITIS: Status: RESOLVED | Noted: 2023-04-18 | Resolved: 2024-04-01

## 2024-04-01 PROBLEM — K92.1 HEMATOCHEZIA: Status: RESOLVED | Noted: 2023-04-18 | Resolved: 2024-04-01

## 2024-04-01 PROBLEM — R11.2 NAUSEA, VOMITING, AND DIARRHEA: Status: RESOLVED | Noted: 2023-04-18 | Resolved: 2024-04-01

## 2024-04-01 PROCEDURE — 1036F TOBACCO NON-USER: CPT | Performed by: FAMILY MEDICINE

## 2024-04-01 PROCEDURE — 1159F MED LIST DOCD IN RCRD: CPT | Performed by: FAMILY MEDICINE

## 2024-04-01 PROCEDURE — 1160F RVW MEDS BY RX/DR IN RCRD: CPT | Performed by: FAMILY MEDICINE

## 2024-04-01 PROCEDURE — 99214 OFFICE O/P EST MOD 30 MIN: CPT | Performed by: FAMILY MEDICINE

## 2024-04-01 NOTE — PROGRESS NOTES
Subjective   Patient ID: Toney Sierra is a 73 y.o. female who presents for Lab Review FU (Pt in today for lab review FU).    Review of Systems  Denies N/V/D/C, no HA/S/V, denies rashes/lesions, no CP/SOB. Denies fevers/chills.  All other systems were negative.    Objective   Physical Exam  Gen: NAD  eyes: EOMI, PERRLA  ENT: hearing grossly intact, no nasal discharge  resp: CTABL, without R/R  heart: RRR without MRG  GI: abd: S/ND/NT, BS+  lymph: no axillary, cervical, supraclavicular lymphadenopathy noted   MS: gait grossly WNL,  derm: no rashes or lesions noted  neuro: CN II-XII grossly intact  psych: A&Ox3    Assessment/Plan   There are no diagnoses linked to this encounter.         Rao Babin,  04/01/24 3:22 PM       2025 will be next Medicare wellness visit and annual preventative visit    ------    Immunization councelling: Due for annual flu shot, as well as the latest covid booster. Pneumonia has had the PPSV23, is due for the PCV 20. Due for shingles series. Due for Tdap. Also due for new RSV shot. -->> Check with your pharmacy to keep up-to-date on immunizations.     Most recent colonoscopy was early 2023.  Due for repeat 2028.    Due for mammogram.  Patient declines offer for orders today.    ------    Overweight, BMI 29. down from max of 180. -->>  Keep up the good overall work.        New labs reviewed:   Annual labs will be around September.    - h/o Iron deficiency anemia: Iron still at the lower end of normal at 27%, however not anemic over the last year, red blood cells are good size and color.  -->>  Will plan in the future to just check blood counts with annual labs.    -Prediabetes:, A1c last time was 5.6, was 5.5, was 5.9, 5.8.  Will check with next labs. -->>  Continue cutting back on simple carbohydrates, things like bread, pasta, potatoes, rice, sugars etc.     - vitamin D deficiency: 31, was 31.  Patient is taking vitamin D, 3000 units daily and has been for 6 months. -->>  Increase  your dose to 50,000 units weekly.  That would be about 7000 a day average.  Could buy 5000 unit capsules and take 1 every day but Monday Wednesday Friday take 2.  Will recheck with next annual labs    -History of subclinical hypothyroidism.  Thyroid panel all within normal limits.  Will just plan to check TSH annually from now on.        Regarding previous labs:    - Drug therapy, screening for condition:     -Hyperlipidemia: most recent HDL was 69, goal is over 45, LDL was 72, goal is 70 or less, total to good ratio is 2.4, goal is less than 2.8, so cholesterol numbers were great on atorvastatin 20 mg daily plus co-Q10. -->>  We will be checking fasting lipid panel with next labs.           Headaches associated with visual disturbance.  Migraine is in the differential. Maybe gets 1 every month or 2.  Lasting for only maybe 5 minutes.  Does well enough taking Excedrin Migraine as needed. -->>  Could consider restarting magnesium to see if that helps prevent the headaches.  Will reevaluate next time.          - Will return the last week of September for annual lab review.  - We ordered annual labs for patient to get fasting annual labs at any [facility] blood draw location a week before next appointment.

## 2024-05-13 DIAGNOSIS — E78.5 HYPERLIPIDEMIA, UNSPECIFIED HYPERLIPIDEMIA TYPE: ICD-10-CM

## 2024-05-13 RX ORDER — ATORVASTATIN CALCIUM 20 MG/1
20 TABLET, FILM COATED ORAL DAILY
Qty: 90 TABLET | Refills: 3 | OUTPATIENT
Start: 2024-05-13

## 2024-05-13 RX ORDER — ATORVASTATIN CALCIUM 20 MG/1
20 TABLET, FILM COATED ORAL DAILY
Qty: 90 TABLET | Refills: 1 | Status: SHIPPED | OUTPATIENT
Start: 2024-05-13

## 2024-09-20 ENCOUNTER — LAB (OUTPATIENT)
Dept: LAB | Facility: LAB | Age: 74
End: 2024-09-20
Payer: MEDICARE

## 2024-09-20 DIAGNOSIS — E78.41 ELEVATED LIPOPROTEIN(A): ICD-10-CM

## 2024-09-20 DIAGNOSIS — E55.9 VITAMIN D DEFICIENCY: ICD-10-CM

## 2024-09-20 DIAGNOSIS — R73.03 PREDIABETES: ICD-10-CM

## 2024-09-20 DIAGNOSIS — Z00.00 PREVENTATIVE HEALTH CARE: ICD-10-CM

## 2024-09-20 DIAGNOSIS — Z13.9 SCREENING FOR CONDITION: ICD-10-CM

## 2024-09-20 DIAGNOSIS — Z79.899 DRUG THERAPY: ICD-10-CM

## 2024-09-20 LAB
25(OH)D3 SERPL-MCNC: 33 NG/ML (ref 30–100)
ALBUMIN SERPL BCP-MCNC: 4.3 G/DL (ref 3.4–5)
ALP SERPL-CCNC: 62 U/L (ref 33–136)
ALT SERPL W P-5'-P-CCNC: 11 U/L (ref 7–45)
ANION GAP SERPL CALC-SCNC: 12 MMOL/L (ref 10–20)
APPEARANCE UR: CLEAR
AST SERPL W P-5'-P-CCNC: 12 U/L (ref 9–39)
BASOPHILS # BLD AUTO: 0.01 X10*3/UL (ref 0–0.1)
BASOPHILS NFR BLD AUTO: 0.2 %
BILIRUB SERPL-MCNC: 0.8 MG/DL (ref 0–1.2)
BILIRUB UR STRIP.AUTO-MCNC: NEGATIVE MG/DL
BUN SERPL-MCNC: 13 MG/DL (ref 6–23)
CALCIUM SERPL-MCNC: 9 MG/DL (ref 8.6–10.3)
CHLORIDE SERPL-SCNC: 108 MMOL/L (ref 98–107)
CHOLEST SERPL-MCNC: 188 MG/DL (ref 0–199)
CHOLESTEROL/HDL RATIO: 2.8
CO2 SERPL-SCNC: 25 MMOL/L (ref 21–32)
COLOR UR: NORMAL
CREAT SERPL-MCNC: 0.77 MG/DL (ref 0.5–1.05)
EGFRCR SERPLBLD CKD-EPI 2021: 82 ML/MIN/1.73M*2
EOSINOPHIL # BLD AUTO: 0.11 X10*3/UL (ref 0–0.4)
EOSINOPHIL NFR BLD AUTO: 2.1 %
ERYTHROCYTE [DISTWIDTH] IN BLOOD BY AUTOMATED COUNT: 12.8 % (ref 11.5–14.5)
EST. AVERAGE GLUCOSE BLD GHB EST-MCNC: 131 MG/DL
GLUCOSE SERPL-MCNC: 156 MG/DL (ref 74–99)
GLUCOSE UR STRIP.AUTO-MCNC: NORMAL MG/DL
HBA1C MFR BLD: 6.2 %
HCT VFR BLD AUTO: 44.9 % (ref 36–46)
HDLC SERPL-MCNC: 67.8 MG/DL
HGB BLD-MCNC: 14.7 G/DL (ref 12–16)
IMM GRANULOCYTES # BLD AUTO: 0.02 X10*3/UL (ref 0–0.5)
IMM GRANULOCYTES NFR BLD AUTO: 0.4 % (ref 0–0.9)
KETONES UR STRIP.AUTO-MCNC: NEGATIVE MG/DL
LDLC SERPL CALC-MCNC: 93 MG/DL
LEUKOCYTE ESTERASE UR QL STRIP.AUTO: NEGATIVE
LYMPHOCYTES # BLD AUTO: 1.63 X10*3/UL (ref 0.8–3)
LYMPHOCYTES NFR BLD AUTO: 30.5 %
MAGNESIUM SERPL-MCNC: 2.16 MG/DL (ref 1.6–2.4)
MCH RBC QN AUTO: 30.8 PG (ref 26–34)
MCHC RBC AUTO-ENTMCNC: 32.7 G/DL (ref 32–36)
MCV RBC AUTO: 94 FL (ref 80–100)
MONOCYTES # BLD AUTO: 0.46 X10*3/UL (ref 0.05–0.8)
MONOCYTES NFR BLD AUTO: 8.6 %
NEUTROPHILS # BLD AUTO: 3.11 X10*3/UL (ref 1.6–5.5)
NEUTROPHILS NFR BLD AUTO: 58.2 %
NITRITE UR QL STRIP.AUTO: NEGATIVE
NON HDL CHOLESTEROL: 120 MG/DL (ref 0–149)
NRBC BLD-RTO: 0 /100 WBCS (ref 0–0)
PH UR STRIP.AUTO: 6 [PH]
PLATELET # BLD AUTO: 162 X10*3/UL (ref 150–450)
POTASSIUM SERPL-SCNC: 4.1 MMOL/L (ref 3.5–5.3)
PROT SERPL-MCNC: 6.6 G/DL (ref 6.4–8.2)
PROT UR STRIP.AUTO-MCNC: NEGATIVE MG/DL
RBC # BLD AUTO: 4.77 X10*6/UL (ref 4–5.2)
RBC # UR STRIP.AUTO: NEGATIVE /UL
SODIUM SERPL-SCNC: 141 MMOL/L (ref 136–145)
SP GR UR STRIP.AUTO: 1.01
TRIGL SERPL-MCNC: 134 MG/DL (ref 0–149)
TSH SERPL-ACNC: 3.51 MIU/L (ref 0.44–3.98)
UROBILINOGEN UR STRIP.AUTO-MCNC: NORMAL MG/DL
VLDL: 27 MG/DL (ref 0–40)
WBC # BLD AUTO: 5.3 X10*3/UL (ref 4.4–11.3)

## 2024-09-20 PROCEDURE — 36415 COLL VENOUS BLD VENIPUNCTURE: CPT

## 2024-09-20 PROCEDURE — 83036 HEMOGLOBIN GLYCOSYLATED A1C: CPT

## 2024-09-23 ENCOUNTER — APPOINTMENT (OUTPATIENT)
Dept: PRIMARY CARE | Facility: CLINIC | Age: 74
End: 2024-09-23
Payer: MEDICARE

## 2024-09-25 ENCOUNTER — APPOINTMENT (OUTPATIENT)
Dept: PRIMARY CARE | Facility: CLINIC | Age: 74
End: 2024-09-25
Payer: MEDICARE

## 2024-10-03 ENCOUNTER — APPOINTMENT (OUTPATIENT)
Dept: PRIMARY CARE | Facility: CLINIC | Age: 74
End: 2024-10-03
Payer: MEDICARE

## 2024-10-03 VITALS
HEIGHT: 65 IN | OXYGEN SATURATION: 95 % | WEIGHT: 180 LBS | HEART RATE: 85 BPM | DIASTOLIC BLOOD PRESSURE: 89 MMHG | BODY MASS INDEX: 29.99 KG/M2 | SYSTOLIC BLOOD PRESSURE: 133 MMHG

## 2024-10-03 DIAGNOSIS — Z13.9 SCREENING FOR CONDITION: ICD-10-CM

## 2024-10-03 DIAGNOSIS — R73.03 PREDIABETES: ICD-10-CM

## 2024-10-03 DIAGNOSIS — L82.1 SEBORRHEIC KERATOSIS: ICD-10-CM

## 2024-10-03 DIAGNOSIS — E66.3 OVERWEIGHT: ICD-10-CM

## 2024-10-03 DIAGNOSIS — E55.9 VITAMIN D DEFICIENCY: ICD-10-CM

## 2024-10-03 DIAGNOSIS — Z79.899 DRUG THERAPY: ICD-10-CM

## 2024-10-03 DIAGNOSIS — E78.5 HYPERLIPIDEMIA, UNSPECIFIED HYPERLIPIDEMIA TYPE: Primary | ICD-10-CM

## 2024-10-03 PROCEDURE — 17000 DESTRUCT PREMALG LESION: CPT | Performed by: FAMILY MEDICINE

## 2024-10-03 PROCEDURE — G0008 ADMIN INFLUENZA VIRUS VAC: HCPCS | Performed by: FAMILY MEDICINE

## 2024-10-03 PROCEDURE — 90662 IIV NO PRSV INCREASED AG IM: CPT | Performed by: FAMILY MEDICINE

## 2024-10-03 PROCEDURE — 3008F BODY MASS INDEX DOCD: CPT | Performed by: FAMILY MEDICINE

## 2024-10-03 PROCEDURE — 99214 OFFICE O/P EST MOD 30 MIN: CPT | Performed by: FAMILY MEDICINE

## 2024-10-03 PROCEDURE — 1036F TOBACCO NON-USER: CPT | Performed by: FAMILY MEDICINE

## 2024-10-03 PROCEDURE — 1159F MED LIST DOCD IN RCRD: CPT | Performed by: FAMILY MEDICINE

## 2024-10-03 RX ORDER — ROSUVASTATIN CALCIUM 10 MG/1
10 TABLET, COATED ORAL DAILY
Qty: 100 TABLET | Refills: 3 | Status: SHIPPED | OUTPATIENT
Start: 2024-10-03 | End: 2025-11-07

## 2024-10-03 NOTE — PROGRESS NOTES
Subjective   Patient ID: Toney Sierra is a 73 y.o. female who presents for Lab REview FU (Pt in today for routine lab review FU).    Review of Systems  Denies N/V/D/C, no HA/S/V, denies rashes/lesions, no CP/SOB. Denies fevers/chills.  All other systems were negative.    Objective   Physical Exam  Gen: NAD  eyes: EOMI, PERRLA  ENT: hearing grossly intact, no nasal discharge  resp: CTABL, without R/R  heart: RRR without MRG  GI: abd: S/ND/NT, BS+  lymph: no axillary, cervical, supraclavicular lymphadenopathy noted   MS: gait grossly WNL,  derm: no rashes or lesions noted  neuro: CN II-XII grossly intact  psych: A&Ox3    Assessment/Plan   There are no diagnoses linked to this encounter.         Rao Babin, DO 10/03/24 11:54 AM       2025 will be next Medicare wellness visit and annual preventative visit    ------    Immunization councelling: Due for annual flu shot, as well as the latest covid booster. Pneumonia has had the PPSV23, is due for the PCV 20. Due for shingles series. Due for Tdap. Also due for new RSV shot. -->> Check with your pharmacy to keep up-to-date on immunizations.     Most recent colonoscopy was early 2023.  Due for repeat 2028.    Due for mammogram.  Patient declines offer for orders today.    ------    Overweight, BMI 29.  Up a few pound since last appointment here.  Patient notes she has not been eating as well as before. -->>  Cut back at least a little on simple carbohydrates, things like bread, pasta, potatoes, rice, sugars etc., or alcohol which her body treats like sugar.        New annual labs reviewed:   Annual labs will be around September 2025.      -Hyperlipidemia: LDL is 93, goal is less than 70, however HDL is elevated at 68, when you put it together you get a total to good ratio of 2.8, our goal is 2.8 or less, so cholesterol is well enough controlled atorvastatin 20 mg daily plus co-Q10. -->>  We will be checking fasting lipid panel with next annual labs.        -Prediabetes:, A1c 6.2,  5.6, 5.5, 5.9, 5.8.  We note patient is on atorvastatin, which can raise sugar a little more than rosuvastatin. -->>  Will switch from atorvastatin to rosuvastatin.  Continue cutting back on simple carbohydrates, things like bread, pasta, potatoes, rice, sugars etc.     - h/o Iron deficiency anemia: Iron still at the lower end of normal at 27%, however not anemic over the last year, red blood cells are good size and color.  -->>  Will plan in the future to just check blood counts with annual labs.    - vitamin D deficiency: 33, was 31, 31.  Patient is taking vitamin D, 25,000 per week. -->>  Increase your dose to 50,000 units weekly.  That would be about 7000 a day average.  Could buy 5000 unit capsules and take 1 every day but Monday Wednesday Friday take 2.  Could just get 10,000 unit capsules and take them Monday through Friday.  Will recheck with next annual labs.    - Drug therapy, screening for condition: Sh within normal limits.      Headaches associated with visual disturbance.  Migraine is in the differential. Maybe gets 1 every month or 2.  Lasting for only maybe 5 minutes.  Does well enough taking Excedrin Migraine as needed. -->>  Could consider restarting magnesium to see if that helps prevent the headaches.  Will reevaluate next time.     6 x 9 mm SK behind inferior L ear.  Unable to wear earrings because it is in the way, it rubs, gets irritated.  We discussed freezing it and possibilities of infection or scarring.  Patient and I both agree the potential benefits outweigh the potential risks. -->>  Frozen twice with liquid nitrogen spray x 30 seconds     - Will administer high-dose flu shot today.    - Will return about 6 months for Medicare wellness visit, annual preventative visit, and in office A1c.

## 2024-10-18 ENCOUNTER — PATIENT OUTREACH (OUTPATIENT)
Dept: PRIMARY CARE | Facility: CLINIC | Age: 74
End: 2024-10-18
Payer: MEDICARE

## 2024-10-18 DIAGNOSIS — Z12.31 ENCOUNTER FOR SCREENING MAMMOGRAM FOR BREAST CANCER: ICD-10-CM

## 2024-11-18 ENCOUNTER — TELEPHONE (OUTPATIENT)
Dept: PRIMARY CARE | Facility: CLINIC | Age: 74
End: 2024-11-18
Payer: MEDICARE

## 2024-11-18 DIAGNOSIS — H93.19 TINNITUS, UNSPECIFIED LATERALITY: Primary | ICD-10-CM

## 2025-01-24 ENCOUNTER — CLINICAL SUPPORT (OUTPATIENT)
Dept: AUDIOLOGY | Facility: CLINIC | Age: 75
End: 2025-01-24
Payer: MEDICARE

## 2025-01-24 ENCOUNTER — APPOINTMENT (OUTPATIENT)
Dept: OTOLARYNGOLOGY | Facility: CLINIC | Age: 75
End: 2025-01-24
Payer: MEDICARE

## 2025-01-24 DIAGNOSIS — H93.13 TINNITUS OF BOTH EARS: ICD-10-CM

## 2025-01-24 DIAGNOSIS — H93.19 TINNITUS, UNSPECIFIED LATERALITY: ICD-10-CM

## 2025-01-24 DIAGNOSIS — H90.3 BILATERAL SENSORINEURAL HEARING LOSS: Primary | ICD-10-CM

## 2025-01-24 PROCEDURE — 1160F RVW MEDS BY RX/DR IN RCRD: CPT | Performed by: OTOLARYNGOLOGY

## 2025-01-24 PROCEDURE — 99203 OFFICE O/P NEW LOW 30 MIN: CPT | Performed by: OTOLARYNGOLOGY

## 2025-01-24 PROCEDURE — 92557 COMPREHENSIVE HEARING TEST: CPT | Performed by: AUDIOLOGIST

## 2025-01-24 PROCEDURE — 1159F MED LIST DOCD IN RCRD: CPT | Performed by: OTOLARYNGOLOGY

## 2025-01-24 PROCEDURE — 92567 TYMPANOMETRY: CPT | Performed by: AUDIOLOGIST

## 2025-01-24 ASSESSMENT — ENCOUNTER SYMPTOMS
CONSTITUTIONAL NEGATIVE: 1
RESPIRATORY NEGATIVE: 1
CARDIOVASCULAR NEGATIVE: 1
NEUROLOGICAL NEGATIVE: 1

## 2025-01-24 NOTE — PROGRESS NOTES
Subjective   Patient ID: Toney Sierra is a 74 y.o. female who presents for Tinnitus.    HPI  Patient presents today for evaluation of tinnitus.  She has bilateral constant tinnitus sounding like static noise or sometimes birds chirping.  She notices no significant decline in hearing.  No worrisome vertigo.  All remaining ENT inquiry is otherwise unremarkable.      Review of Systems   Constitutional: Negative.    HENT: Negative.     Respiratory: Negative.     Cardiovascular: Negative.    Neurological: Negative.        Physical Exam    General appearance: No acute distress. Normal facies. Symmetric facial movement. No gross lesions of the face are noted.  The external ear structures appear normal. The ear canals patent and the tympanic membranes are intact without evidence of air-fluid levels, retraction, or congenital defects.  Anterior rhinoscopy notes essentially a midline nasal septum. Examination is noted for normal healthy mucosal membranes without any evidence of lesions, polyps, or exudate. The tongue is normally mobile. There are no lesions on the gingiva, buccal, or oral mucosa. There are no oral cavity masses.  The neck is negative for mass lymphadenopathy. The trachea and parotid are clear. The thyroid bed is grossly unremarkable. The salivary gland structures are grossly unremarkable.    Audiogram:  Bilateral symmetric high-frequency loss above 1000 Hz down to 60 dB with excellent word discrimination and normal tympanometry      Assessment/Plan     74-year-old with bilateral hearing loss and associated tinnitus.  Detailed discussion today with the patient regarding the findings.  Certainly may consider amplification strategies in conjunction with watching dietary intake of salt chocolate caffeine and watching stress levels to the best of her ability.  I will see her back in a year for next check, sooner with issue.    All questions were answered in this regard accordingly.

## 2025-01-24 NOTE — PROGRESS NOTES
Ms. Sierra, age 74, was seen today for a hearing evaluation during her ENT visit with Dr. Doran to help assess bilateral constant tinnitus.  She stated this has been ongoing for quite some time and reported it sounds most often like static but can also sound like birds chirping at times.  Significant hearing concerns were denied.    Results:  Otoscopy revealed clear ear canals and tympanic membranes were visualized bilaterally.  Tympanometry revealed normal, Type A tympanograms, indicating normal ear canal volume, peak pressure and compliance bilaterally.   Audiometric thresholds revealed normal hearing sensitivity 250-1000 Hz sloping to a moderate sensorineural hearing loss bilaterally.  Word recognition scores were excellent bilaterally.    Recommendations:  Follow-up with PCP, Dr. Babin, as medically directed.  Follow-up with ENT, Dr. Castaneda, as medically directed.  Consideration for binaural amplification.  Retest hearing annually to monitor.

## 2025-04-03 ENCOUNTER — APPOINTMENT (OUTPATIENT)
Dept: PRIMARY CARE | Facility: CLINIC | Age: 75
End: 2025-04-03
Payer: MEDICARE

## 2025-04-03 VITALS
HEART RATE: 97 BPM | WEIGHT: 185 LBS | DIASTOLIC BLOOD PRESSURE: 100 MMHG | HEIGHT: 65 IN | BODY MASS INDEX: 30.82 KG/M2 | OXYGEN SATURATION: 95 % | SYSTOLIC BLOOD PRESSURE: 155 MMHG

## 2025-04-03 DIAGNOSIS — E66.3 OVERWEIGHT: ICD-10-CM

## 2025-04-03 DIAGNOSIS — Z79.899 DRUG THERAPY: ICD-10-CM

## 2025-04-03 DIAGNOSIS — Z53.20 BREAST CANCER SCREENING DECLINED: ICD-10-CM

## 2025-04-03 DIAGNOSIS — Z00.00 ROUTINE GENERAL MEDICAL EXAMINATION AT HEALTH CARE FACILITY: Primary | ICD-10-CM

## 2025-04-03 DIAGNOSIS — E78.5 HYPERLIPIDEMIA, UNSPECIFIED HYPERLIPIDEMIA TYPE: ICD-10-CM

## 2025-04-03 DIAGNOSIS — Z13.9 SCREENING FOR CONDITION: ICD-10-CM

## 2025-04-03 DIAGNOSIS — E55.9 VITAMIN D DEFICIENCY: ICD-10-CM

## 2025-04-03 DIAGNOSIS — R73.03 PREDIABETES: ICD-10-CM

## 2025-04-03 PROCEDURE — 99397 PER PM REEVAL EST PAT 65+ YR: CPT | Performed by: FAMILY MEDICINE

## 2025-04-03 PROCEDURE — 1170F FXNL STATUS ASSESSED: CPT | Performed by: FAMILY MEDICINE

## 2025-04-03 PROCEDURE — 1160F RVW MEDS BY RX/DR IN RCRD: CPT | Performed by: FAMILY MEDICINE

## 2025-04-03 PROCEDURE — G0439 PPPS, SUBSEQ VISIT: HCPCS | Performed by: FAMILY MEDICINE

## 2025-04-03 PROCEDURE — 1159F MED LIST DOCD IN RCRD: CPT | Performed by: FAMILY MEDICINE

## 2025-04-03 PROCEDURE — 1123F ACP DISCUSS/DSCN MKR DOCD: CPT | Performed by: FAMILY MEDICINE

## 2025-04-03 PROCEDURE — 99214 OFFICE O/P EST MOD 30 MIN: CPT | Performed by: FAMILY MEDICINE

## 2025-04-03 PROCEDURE — 3008F BODY MASS INDEX DOCD: CPT | Performed by: FAMILY MEDICINE

## 2025-04-03 ASSESSMENT — ACTIVITIES OF DAILY LIVING (ADL)
MANAGING_FINANCES: INDEPENDENT
DRESSING: INDEPENDENT
DOING_HOUSEWORK: INDEPENDENT
TAKING_MEDICATION: INDEPENDENT
BATHING: INDEPENDENT
GROCERY_SHOPPING: INDEPENDENT

## 2025-04-03 ASSESSMENT — PATIENT HEALTH QUESTIONNAIRE - PHQ9
2. FEELING DOWN, DEPRESSED OR HOPELESS: NOT AT ALL
SUM OF ALL RESPONSES TO PHQ9 QUESTIONS 1 AND 2: 0
1. LITTLE INTEREST OR PLEASURE IN DOING THINGS: NOT AT ALL

## 2025-04-03 ASSESSMENT — ENCOUNTER SYMPTOMS
OCCASIONAL FEELINGS OF UNSTEADINESS: 0
LOSS OF SENSATION IN FEET: 0
DEPRESSION: 0

## 2025-04-03 NOTE — PATIENT INSTRUCTIONS
Medicare wellness visit and annual preventative visit    ------    Immunization councelling: Up to date on annual flu shot. Due for latest covid booster. Pneumonia has had the PPSV23, is due for the PCV 20. Due for shingles series. Due for Tdap. Also due for new RSV shot. -->> Check with your pharmacy to keep up-to-date on immunizations.     Screening for colon cancer: Most recent colonoscopy was early 2023.  Due for repeat 2028.    Screening for breast cancer: Due for mammogram.  Patient declines offer for orders today.    ------    Overweight, BMI 30.  Up about 5 pound since last appointment here 6 mo ago.   -->>  Cut back at least a little on simple carbohydrates, things like bread, pasta, potatoes, rice, sugars etc., or alcohol which her body treats like sugar.        Regarding previous labs:   Annual labs will be around September 2025.      -Hyperlipidemia: LDL is 93, goal is less than 70, however HDL is elevated at 68, when you put it together you get a total to good ratio of 2.8, our goal is 2.8 or less, so cholesterol is well enough controlled atorvastatin 20 mg daily plus co-Q10.  Since these labs were done we did change to rosuvastatin 10 mg a day. -->>  We will be checking fasting lipid panel with next annual labs.       -Prediabetes:, A1c 6.2,  5.6, 5.5, 5.9, 5.8.  We note patient is on atorvastatin, which can raise sugar a little more than rosuvastatin. -->>  Will switch from atorvastatin to rosuvastatin.  Continue cutting back on simple carbohydrates, things like bread, pasta, potatoes, rice, sugars etc.     - h/o Iron deficiency anemia: Iron still at the lower end of normal at 27%, however not anemic over the last year, red blood cells are good size and color.  -->>  Will plan in the future to just check blood counts with annual labs.    - vitamin D deficiency: 33, was 31, 31.  Patient is taking vitamin D, 55,000 per week, which was increased since this lab was done when patient was only taking 25,000  units a week. -->>  Continue current therapy.  Will recheck with next annual labs.    - Drug therapy, screening for condition: TSH within normal limits.      Headaches associated with visual disturbance.  Migraine is in the differential. Maybe gets 1 every month or 2.  Lasting for only maybe 5 minutes.  Does well enough taking Excedrin Migraine as needed.  Is already on magnesium, 250 mg supplement daily. -->>  Will discuss next time..        - Will return about 6 months for annual lab review.  - We will print patient's lab slips so she can go to her local [facility] before next appointment.

## 2025-04-03 NOTE — PROGRESS NOTES
Subjective   Patient ID: Toney Sierra is a 74 y.o. female who presents for Medicare Annual Wellness Visit Subsequent.    Review of Systems  Denies N/V/D/C, no HA/S/V, denies rashes/lesions, no CP. Denies fevers/chills.  Positive for shortness of breath with exertion.  All other systems were negative.    Objective   Physical Exam  Gen: NAD  eyes: EOMI, PERRLA  ENT: hearing grossly intact, no nasal discharge  resp: CTABL, without R/R  heart: RRR without MRG  GI: abd: S/ND/NT, BS+  lymph: no axillary, cervical, supraclavicular lymphadenopathy noted   MS: gait grossly WNL,  derm: no rashes or lesions noted  neuro: CN II-XII grossly intact  psych: A&Ox3    Assessment/Plan   Diagnoses and all orders for this visit:  Breast cancer screening declined  Drug therapy  -     CBC and Auto Differential; Future  -     Comprehensive Metabolic Panel; Future  -     Magnesium; Future  -     Urinalysis with Reflex Microscopic; Future  Screening for condition  -     TSH with reflex to Free T4 if abnormal; Future  Vitamin D deficiency  -     Vitamin D 25-Hydroxy,Total (for eval of Vitamin D levels); Future  Hyperlipidemia, unspecified hyperlipidemia type  -     Lipid Panel; Future  Prediabetes  -     Hemoglobin A1C; Future  Overweight           Rao Babin DO 04/03/25 12:02 PM       Medicare wellness visit and annual preventative visit    ------    Immunization councelling: Up to date on annual flu shot. Due for latest covid booster. Pneumonia has had the PPSV23, is due for the PCV 20. Due for shingles series. Due for Tdap. Also due for new RSV shot. -->> Check with your pharmacy to keep up-to-date on immunizations.     Screening for colon cancer: Most recent colonoscopy was early 2023.  Due for repeat 2028.    Screening for breast cancer: Due for mammogram.  Patient declines offer for orders today.    ------    Overweight, BMI 30.  Up about 5 pound since last appointment here 6 mo ago.   -->>  Cut back at least a little on simple  carbohydrates, things like bread, pasta, potatoes, rice, sugars etc., or alcohol which her body treats like sugar.        Regarding previous labs:   Annual labs will be around September 2025.      -Hyperlipidemia: LDL is 93, goal is less than 70, however HDL is elevated at 68, when you put it together you get a total to good ratio of 2.8, our goal is 2.8 or less, so cholesterol is well enough controlled atorvastatin 20 mg daily plus co-Q10.  Since these labs were done we did change to rosuvastatin 10 mg a day. -->>  We will be checking fasting lipid panel with next annual labs.       -Prediabetes:, A1c 6.2,  5.6, 5.5, 5.9, 5.8.  We note patient is on atorvastatin, which can raise sugar a little more than rosuvastatin. -->>  Will switch from atorvastatin to rosuvastatin.  Continue cutting back on simple carbohydrates, things like bread, pasta, potatoes, rice, sugars etc.     - h/o Iron deficiency anemia: Iron still at the lower end of normal at 27%, however not anemic over the last year, red blood cells are good size and color.  -->>  Will plan in the future to just check blood counts with annual labs.    - vitamin D deficiency: 33, was 31, 31.  Patient is taking vitamin D, 55,000 per week, which was increased since this lab was done when patient was only taking 25,000 units a week. -->>  Continue current therapy.  Will recheck with next annual labs.    - Drug therapy, screening for condition: TSH within normal limits.      Headaches associated with visual disturbance.  Migraine is in the differential. Maybe gets 1 every month or 2.  Lasting for only maybe 5 minutes.  Does well enough taking Excedrin Migraine as needed.  Is already on magnesium, 250 mg supplement daily. -->>  Will discuss next time..        - Will return about 6 months for annual lab review.  - We will print patient's lab slips so she can go to her local [facility] before next appointment.

## 2025-06-16 DIAGNOSIS — Z12.31 ENCOUNTER FOR SCREENING MAMMOGRAM FOR BREAST CANCER: ICD-10-CM

## 2025-08-25 DIAGNOSIS — E78.5 HYPERLIPIDEMIA, UNSPECIFIED HYPERLIPIDEMIA TYPE: ICD-10-CM

## 2025-08-25 RX ORDER — ROSUVASTATIN CALCIUM 10 MG/1
TABLET, COATED ORAL
Qty: 100 TABLET | Refills: 3 | Status: SHIPPED | OUTPATIENT
Start: 2025-08-25

## 2025-10-09 ENCOUNTER — APPOINTMENT (OUTPATIENT)
Dept: PRIMARY CARE | Facility: CLINIC | Age: 75
End: 2025-10-09
Payer: MEDICARE

## 2026-01-26 ENCOUNTER — APPOINTMENT (OUTPATIENT)
Dept: OTOLARYNGOLOGY | Facility: CLINIC | Age: 76
End: 2026-01-26
Payer: MEDICARE